# Patient Record
Sex: FEMALE | Race: BLACK OR AFRICAN AMERICAN | NOT HISPANIC OR LATINO | Employment: UNEMPLOYED | URBAN - METROPOLITAN AREA
[De-identification: names, ages, dates, MRNs, and addresses within clinical notes are randomized per-mention and may not be internally consistent; named-entity substitution may affect disease eponyms.]

---

## 2017-06-26 ENCOUNTER — HOSPITAL ENCOUNTER (INPATIENT)
Facility: HOSPITAL | Age: 67
LOS: 1 days | Discharge: HOME | DRG: 309 | End: 2017-06-27
Attending: EMERGENCY MEDICINE | Admitting: HOSPITALIST
Payer: MEDICARE

## 2017-06-26 DIAGNOSIS — R55 SYNCOPE AND COLLAPSE: ICD-10-CM

## 2017-06-26 DIAGNOSIS — R41.82 ALTERED MENTAL STATUS: ICD-10-CM

## 2017-06-26 DIAGNOSIS — I48.0 PAROXYSMAL ATRIAL FIBRILLATION: ICD-10-CM

## 2017-06-26 DIAGNOSIS — R00.1 BRADYCARDIA: Primary | ICD-10-CM

## 2017-06-26 PROBLEM — E11.9 NEW ONSET TYPE 2 DIABETES MELLITUS: Status: ACTIVE | Noted: 2017-06-26

## 2017-06-26 PROBLEM — E87.6 ACUTE HYPOKALEMIA: Status: ACTIVE | Noted: 2017-06-26

## 2017-06-26 PROBLEM — E66.01 MORBID OBESITY DUE TO EXCESS CALORIES: Status: ACTIVE | Noted: 2017-06-26

## 2017-06-26 PROBLEM — D69.6 THROMBOCYTOPENIA, UNSPECIFIED: Status: ACTIVE | Noted: 2017-06-26

## 2017-06-26 PROBLEM — L40.9 PSORIASIS: Status: ACTIVE | Noted: 2017-06-26

## 2017-06-26 LAB
ALBUMIN SERPL BCP-MCNC: 3.5 G/DL
ALP SERPL-CCNC: 81 U/L
ALT SERPL W/O P-5'-P-CCNC: 42 U/L
AMPHET+METHAMPHET UR QL: NEGATIVE
ANION GAP SERPL CALC-SCNC: 11 MMOL/L
AST SERPL-CCNC: 38 U/L
BARBITURATES UR QL SCN>200 NG/ML: NEGATIVE
BASOPHILS # BLD AUTO: 0.01 K/UL
BASOPHILS NFR BLD: 0.1 %
BENZODIAZ UR QL SCN>200 NG/ML: NEGATIVE
BILIRUB SERPL-MCNC: 0.7 MG/DL
BUN SERPL-MCNC: 20 MG/DL
BZE UR QL SCN: NEGATIVE
CALCIUM SERPL-MCNC: 8.8 MG/DL
CANNABINOIDS UR QL SCN: NEGATIVE
CHLORIDE SERPL-SCNC: 108 MMOL/L
CO2 SERPL-SCNC: 21 MMOL/L
CREAT SERPL-MCNC: 0.9 MG/DL
CREAT UR-MCNC: 24 MG/DL
DIFFERENTIAL METHOD: ABNORMAL
EOSINOPHIL # BLD AUTO: 0.1 K/UL
EOSINOPHIL NFR BLD: 0.7 %
ERYTHROCYTE [DISTWIDTH] IN BLOOD BY AUTOMATED COUNT: 13.2 %
EST. GFR  (AFRICAN AMERICAN): >60 ML/MIN/1.73 M^2
EST. GFR  (NON AFRICAN AMERICAN): >60 ML/MIN/1.73 M^2
ESTIMATED AVG GLUCOSE: 88 MG/DL
GLUCOSE SERPL-MCNC: 233 MG/DL
HBA1C MFR BLD HPLC: 4.7 %
HCT VFR BLD AUTO: 37 %
HGB BLD-MCNC: 12.6 G/DL
LACTATE SERPL-SCNC: 2.2 MMOL/L
LYMPHOCYTES # BLD AUTO: 1.6 K/UL
LYMPHOCYTES NFR BLD: 22.5 %
MAGNESIUM SERPL-MCNC: 1.6 MG/DL
MCH RBC QN AUTO: 30.6 PG
MCHC RBC AUTO-ENTMCNC: 34.1 %
MCV RBC AUTO: 90 FL
METHADONE UR QL SCN>300 NG/ML: NEGATIVE
MONOCYTES # BLD AUTO: 0.1 K/UL
MONOCYTES NFR BLD: 1.1 %
NEUTROPHILS # BLD AUTO: 5.5 K/UL
NEUTROPHILS NFR BLD: 75.3 %
OPIATES UR QL SCN: NEGATIVE
PCP UR QL SCN>25 NG/ML: NEGATIVE
PLATELET # BLD AUTO: 137 K/UL
PMV BLD AUTO: 11 FL
POCT GLUCOSE: 108 MG/DL (ref 70–110)
POCT GLUCOSE: 231 MG/DL (ref 70–110)
POCT GLUCOSE: 95 MG/DL (ref 70–110)
POTASSIUM SERPL-SCNC: 3.1 MMOL/L
PROT SERPL-MCNC: 7.3 G/DL
RBC # BLD AUTO: 4.12 M/UL
SODIUM SERPL-SCNC: 140 MMOL/L
TOXICOLOGY INFORMATION: NORMAL
TROPONIN I SERPL DL<=0.01 NG/ML-MCNC: <0.006 NG/ML
TSH SERPL DL<=0.005 MIU/L-ACNC: 2.3 UIU/ML
WBC # BLD AUTO: 7.28 K/UL

## 2017-06-26 PROCEDURE — 93010 ELECTROCARDIOGRAM REPORT: CPT | Mod: 76,,, | Performed by: INTERNAL MEDICINE

## 2017-06-26 PROCEDURE — 84443 ASSAY THYROID STIM HORMONE: CPT

## 2017-06-26 PROCEDURE — 63600175 PHARM REV CODE 636 W HCPCS: Performed by: HOSPITALIST

## 2017-06-26 PROCEDURE — 96365 THER/PROPH/DIAG IV INF INIT: CPT

## 2017-06-26 PROCEDURE — 25000003 PHARM REV CODE 250: Performed by: HOSPITALIST

## 2017-06-26 PROCEDURE — 82962 GLUCOSE BLOOD TEST: CPT

## 2017-06-26 PROCEDURE — 83605 ASSAY OF LACTIC ACID: CPT

## 2017-06-26 PROCEDURE — 83036 HEMOGLOBIN GLYCOSYLATED A1C: CPT

## 2017-06-26 PROCEDURE — 93010 ELECTROCARDIOGRAM REPORT: CPT | Mod: ,,, | Performed by: INTERNAL MEDICINE

## 2017-06-26 PROCEDURE — 93005 ELECTROCARDIOGRAM TRACING: CPT

## 2017-06-26 PROCEDURE — 63600175 PHARM REV CODE 636 W HCPCS: Performed by: EMERGENCY MEDICINE

## 2017-06-26 PROCEDURE — 84484 ASSAY OF TROPONIN QUANT: CPT

## 2017-06-26 PROCEDURE — 99282 EMERGENCY DEPT VISIT SF MDM: CPT | Mod: ,,, | Performed by: INTERNAL MEDICINE

## 2017-06-26 PROCEDURE — 96375 TX/PRO/DX INJ NEW DRUG ADDON: CPT

## 2017-06-26 PROCEDURE — 99285 EMERGENCY DEPT VISIT HI MDM: CPT | Mod: 25

## 2017-06-26 PROCEDURE — 99291 CRITICAL CARE FIRST HOUR: CPT | Mod: ,,, | Performed by: EMERGENCY MEDICINE

## 2017-06-26 PROCEDURE — 85025 COMPLETE CBC W/AUTO DIFF WBC: CPT

## 2017-06-26 PROCEDURE — 99233 SBSQ HOSP IP/OBS HIGH 50: CPT | Mod: ,,, | Performed by: HOSPITALIST

## 2017-06-26 PROCEDURE — 11000001 HC ACUTE MED/SURG PRIVATE ROOM

## 2017-06-26 PROCEDURE — 80307 DRUG TEST PRSMV CHEM ANLYZR: CPT

## 2017-06-26 PROCEDURE — 80053 COMPREHEN METABOLIC PANEL: CPT

## 2017-06-26 PROCEDURE — 83735 ASSAY OF MAGNESIUM: CPT

## 2017-06-26 RX ORDER — ONDANSETRON 2 MG/ML
8 INJECTION INTRAMUSCULAR; INTRAVENOUS
Status: COMPLETED | OUTPATIENT
Start: 2017-06-26 | End: 2017-06-26

## 2017-06-26 RX ORDER — POTASSIUM CHLORIDE 7.45 MG/ML
10 INJECTION INTRAVENOUS
Status: COMPLETED | OUTPATIENT
Start: 2017-06-26 | End: 2017-06-26

## 2017-06-26 RX ORDER — GLUCAGON 1 MG
1 KIT INJECTION
Status: DISCONTINUED | OUTPATIENT
Start: 2017-06-26 | End: 2017-06-27 | Stop reason: HOSPADM

## 2017-06-26 RX ORDER — INSULIN ASPART 100 [IU]/ML
0-5 INJECTION, SOLUTION INTRAVENOUS; SUBCUTANEOUS
Status: DISCONTINUED | OUTPATIENT
Start: 2017-06-26 | End: 2017-06-27 | Stop reason: HOSPADM

## 2017-06-26 RX ORDER — ACETAMINOPHEN 325 MG/1
650 TABLET ORAL EVERY 4 HOURS PRN
Status: DISCONTINUED | OUTPATIENT
Start: 2017-06-26 | End: 2017-06-27 | Stop reason: HOSPADM

## 2017-06-26 RX ORDER — SODIUM CHLORIDE 0.9 % (FLUSH) 0.9 %
3 SYRINGE (ML) INJECTION EVERY 8 HOURS
Status: DISCONTINUED | OUTPATIENT
Start: 2017-06-26 | End: 2017-06-27 | Stop reason: HOSPADM

## 2017-06-26 RX ORDER — ENOXAPARIN SODIUM 100 MG/ML
40 INJECTION SUBCUTANEOUS EVERY 24 HOURS
Status: DISCONTINUED | OUTPATIENT
Start: 2017-06-26 | End: 2017-06-27

## 2017-06-26 RX ORDER — POTASSIUM CHLORIDE 20 MEQ/1
40 TABLET, EXTENDED RELEASE ORAL 2 TIMES DAILY
Status: DISCONTINUED | OUTPATIENT
Start: 2017-06-26 | End: 2017-06-27 | Stop reason: HOSPADM

## 2017-06-26 RX ORDER — IBUPROFEN 200 MG
16 TABLET ORAL
Status: DISCONTINUED | OUTPATIENT
Start: 2017-06-26 | End: 2017-06-27 | Stop reason: HOSPADM

## 2017-06-26 RX ORDER — PROMETHAZINE HYDROCHLORIDE 25 MG/1
25 TABLET ORAL EVERY 6 HOURS PRN
Status: DISCONTINUED | OUTPATIENT
Start: 2017-06-26 | End: 2017-06-27 | Stop reason: HOSPADM

## 2017-06-26 RX ORDER — ONDANSETRON 8 MG/1
8 TABLET, ORALLY DISINTEGRATING ORAL EVERY 8 HOURS PRN
Status: DISCONTINUED | OUTPATIENT
Start: 2017-06-26 | End: 2017-06-27 | Stop reason: HOSPADM

## 2017-06-26 RX ORDER — IBUPROFEN 200 MG
24 TABLET ORAL
Status: DISCONTINUED | OUTPATIENT
Start: 2017-06-26 | End: 2017-06-27 | Stop reason: HOSPADM

## 2017-06-26 RX ORDER — HYDROCODONE BITARTRATE AND ACETAMINOPHEN 5; 325 MG/1; MG/1
1 TABLET ORAL EVERY 4 HOURS PRN
Status: DISCONTINUED | OUTPATIENT
Start: 2017-06-26 | End: 2017-06-27 | Stop reason: HOSPADM

## 2017-06-26 RX ADMIN — SODIUM CHLORIDE, PRESERVATIVE FREE 3 ML: 5 INJECTION INTRAVENOUS at 09:06

## 2017-06-26 RX ADMIN — PROMETHAZINE HYDROCHLORIDE 25 MG: 25 TABLET ORAL at 06:06

## 2017-06-26 RX ADMIN — POTASSIUM CHLORIDE 40 MEQ: 1500 TABLET, EXTENDED RELEASE ORAL at 09:06

## 2017-06-26 RX ADMIN — ONDANSETRON 8 MG: 2 INJECTION INTRAMUSCULAR; INTRAVENOUS at 11:06

## 2017-06-26 RX ADMIN — POTASSIUM CHLORIDE 10 MEQ: 10 INJECTION, SOLUTION INTRAVENOUS at 12:06

## 2017-06-26 RX ADMIN — ENOXAPARIN SODIUM 40 MG: 100 INJECTION SUBCUTANEOUS at 06:06

## 2017-06-26 RX ADMIN — ONDANSETRON 8 MG: 8 TABLET, ORALLY DISINTEGRATING ORAL at 03:06

## 2017-06-26 RX ADMIN — POTASSIUM CHLORIDE 40 MEQ: 1500 TABLET, EXTENDED RELEASE ORAL at 02:06

## 2017-06-26 NOTE — ED NOTES
Pt incontinent of stool. Pt cleaned, Linens and gown changed. Pt reports burning at IV site. KCl infusion infusion decreased to 75 ml/hr. Dr. Morales aware.

## 2017-06-26 NOTE — HPI
This is a 66 year old female with new onset DM, morbid obesity, and Psoriasis who presents with complaints of a presyncopal episode.  The patient states she was of her usual state of health standing up talking when she began to feel lightheaded and dizzy and her family had to sit her down.  The episode lasted several minutes where she had a decreased level of consciousness, had diaphoresis, started dry heaving, noted some palpitations.  The patient endorses that she has these type of episodes every 4 to 6 months, but never this severe.  She denies any chest pain or shortness of breath, but her family notes that her breathing appeared labored during the episode.  EMS was activated and upon presentation to the ED, the patient was profoundly bradycardic in the 30s and was given a dose of atropine.  The patient then converted to atrial fibrillation with RVR.

## 2017-06-26 NOTE — ED PROVIDER NOTES
"Encounter Date: 6/26/2017    SCRIBE #1 NOTE: I, Sunil Sawyer, am scribing for, and in the presence of, Dr. Morales.       History     Chief Complaint   Patient presents with    Bradycardia     Time seen by provider: 10:09 AM    This is a 66 y.o. female with pertinent PMHx of DM and gout who presents to the ED via EMS after a sudden onset episode of moderate to severe generalized weakness, dizziness, syncope, nausea, diaphoresis, and confusion that onset at ~9:00 am today. Per the pt's granddaughter the pt has these episodes semi regularly and normally drinks or smells some apple cider vinegar and feels better. However this episode was the longest lasting they had seen as they normally last ~5 minutes and the pt became unresponsive so they called EMS. Granddaughter notes that the pt has DM but does not take any medications at all for anything as she dislikes doctors and hospitals, she also notes that she tried giving the pt a candy when the episode onset and this did help but only briefly. When EMS arrived they noted her heart rate was in the 30's and her blood pressure was 110. Pt was also noted to be diaphoretic but awake. They gave her 2mg atropine and this brought her heart rate up to the 70's and her blood pressure was last recorded at 116 by EMS. Pt denies any pain medicine, drug use, any pain, chest pain, shortness of breath, or headache. When asked the pt's only complaint is of feeling "irritated" and endorses nothing else. There are no other associated symptoms or mitigating/aggravating factors reported at this time.           Review of patient's allergies indicates:  No Known Allergies  Past Medical History:   Diagnosis Date    DM (diabetes mellitus)     Gout      History reviewed. No pertinent surgical history.  History reviewed. No pertinent family history.  Social History   Substance Use Topics    Smoking status: Never Smoker    Smokeless tobacco: Never Used    Alcohol use No     Review of Systems "   Constitutional: Positive for diaphoresis. Negative for chills and fever.   HENT: Negative for congestion.    Eyes: Negative for pain.   Respiratory: Negative for cough and shortness of breath.    Cardiovascular: Negative for chest pain.   Gastrointestinal: Positive for nausea. Negative for abdominal pain and vomiting.   Genitourinary: Negative for difficulty urinating and dysuria.   Musculoskeletal: Negative for back pain and neck pain.   Skin: Negative for rash.   Neurological: Positive for dizziness, speech difficulty and weakness (generalized). Negative for headaches.   Psychiatric/Behavioral: Positive for confusion.       Physical Exam     Initial Vitals   BP Pulse Resp Temp SpO2   -- -- -- -- --      MAP       --         Physical Exam    Nursing note and vitals reviewed.  Constitutional: No distress (acute).   Pt is ill appearing and lethargic.    HENT:   Mouth/Throat: Mucous membranes are dry.   Eyes: No scleral icterus.   Pupils are bilaterally pinpoint.    Neck: Neck supple.   Cardiovascular: Normal rate and regular rhythm.   Pulmonary/Chest: Breath sounds normal. No respiratory distress.   Abdominal: Soft. She exhibits no distension. There is no tenderness.   Musculoskeletal: She exhibits no edema.   Neurological: She is alert and oriented to person, place, and time.   Pt is lethargic but arousable to voice and able to follow commands. No focal weakness.    Skin: No rash noted.         ED Course   Critical Care  Date/Time: 6/26/2017 12:11 PM  Performed by: DIMITRIS BAIRD.  Authorized by: DIMITRIS BAIRD   Total critical care time (exclusive of procedural time) : 60 minutes  Critical care time was exclusive of teaching time and separately billable procedures and treating other patients.  Critical care was necessary to treat or prevent imminent or life-threatening deterioration of the following conditions: CNS failure or compromise.  Critical care was time spent personally by me on the following  activities: development of treatment plan with patient or surrogate, discussions with consultants, evaluation of patient's response to treatment, examination of patient, obtaining history from patient or surrogate, ordering and performing treatments and interventions, ordering and review of laboratory studies, ordering and review of radiographic studies, pulse oximetry and re-evaluation of patient's condition.        Labs Reviewed   CBC W/ AUTO DIFFERENTIAL - Abnormal; Notable for the following:        Result Value    Platelets 137 (*)     Mono # 0.1 (*)     Gran% 75.3 (*)     Mono% 1.1 (*)     All other components within normal limits   COMPREHENSIVE METABOLIC PANEL - Abnormal; Notable for the following:     Potassium 3.1 (*)     CO2 21 (*)     Glucose 233 (*)     All other components within normal limits   POCT GLUCOSE - Abnormal; Notable for the following:     POCT Glucose 231 (*)     All other components within normal limits   MAGNESIUM   TROPONIN I   TSH   LACTIC ACID, PLASMA   DRUG SCREEN PANEL, URINE EMERGENCY   HEMOGLOBIN A1C   POCT GLUCOSE     EKG Readings: (Independently Interpreted)   Sinus rhythm with 1st degree AV block and RBBB.        X-Rays:   Independently Interpreted Readings:   Head CT: No mass or bleed.      Medical Decision Making:   History:   Old Medical Records: I decided to obtain old medical records.  Initial Assessment:   65 yo f, h/o DM but takes no meds (per families), no h/o drug abuse per family, recurrent syncope episodes but has never been to hospital or had work-up, this am about 1 h PTA, pt became less responsive, with n/v and diaphoresis.  EMS called, arrived and found pt very lethargic, HR 30, /palp.  Given atropine 1 x 2 with improvement in HR to 70 and moderate improvement in MS but not back to baseline.  On arrival to ED, VSS but pt lethargic, ill-appearing.  Denies CP/SOB but still reports feeling ill.  Denies CP/SOB.  Pupils pinpoint on arrival but o/w exam without  significant abnormalities.  Differential Diagnosis:   Bradycardia with syncope, resolved.  Unclear etiology.  EKG with RBBB and NSR on arrival.  No old EKG for comparison.  ddx would include inferior wall MI, tox (opiates/clonidine/bblocker/ccb) vs electrolyte (hyperkalemia) vs vasovagal episode  -cardiology eval - do not suspect acute MI  -labs  -monitor closely    AMS - nonfocal exam, no HA/fever and acute in onset  -r/o ICH with CT head  -monitor closely  Independently Interpreted Test(s):   I have ordered and independently interpreted X-rays - see prior notes.  I have ordered and independently interpreted EKG Reading(s) - see prior notes  Clinical Tests:   Lab Tests: Ordered and Reviewed  Radiological Study: Ordered and Reviewed  Medical Tests: Ordered and Reviewed  Other:   I have discussed this case with another health care provider.            Scribe Attestation:   Scribe #1: I performed the above scribed service and the documentation accurately describes the services I performed. I attest to the accuracy of the note.    Attending Attestation:           Physician Attestation for Scribe:  Physician Attestation Statement for Scribe #1: I, Dr. Morales, reviewed documentation, as scribed by Sunil Sawyer in my presence, and it is both accurate and complete.                 ED Course     12:23 PM  Mild hypokalemia but labs o/w unremarkable  CT head negative  Pt now in a fib, intermittently with mild RVR  MS has improved but not back to baseline  Intermittent n/v  Will admit to medicine for further work-up    Clinical Impression:   The primary encounter diagnosis was Bradycardia. Diagnoses of Altered mental status, Syncope and collapse, and Paroxysmal atrial fibrillation were also pertinent to this visit.    Disposition:   Disposition: Admitted                        Catalina Morales MD  06/30/17 4109

## 2017-06-26 NOTE — SUBJECTIVE & OBJECTIVE
Past Medical History:   Diagnosis Date    DM (diabetes mellitus)     Gout        No past surgical history on file.    Review of patient's allergies indicates:  No Known Allergies    No current facility-administered medications on file prior to encounter.      No current outpatient prescriptions on file prior to encounter.     Family History     None        Social History Main Topics    Smoking status: Not on file    Smokeless tobacco: Not on file    Alcohol use Not on file    Drug use: Unknown    Sexual activity: Not on file     Review of Systems   Constitutional: Positive for fatigue. Negative for activity change and appetite change.   HENT: Negative for postnasal drip and rhinorrhea.    Eyes: Positive for visual disturbance.   Respiratory: Negative for cough, chest tightness and shortness of breath.    Cardiovascular: Positive for palpitations. Negative for chest pain.   Gastrointestinal: Positive for nausea and vomiting. Negative for abdominal distention, abdominal pain and diarrhea.   Endocrine: Positive for polydipsia and polyuria. Negative for cold intolerance and heat intolerance.   Genitourinary: Positive for frequency. Negative for difficulty urinating.   Musculoskeletal: Negative for arthralgias.   Skin: Positive for rash.        Silvery plaque on right knee   Neurological: Positive for dizziness, syncope and light-headedness. Negative for tremors, numbness and headaches.   Hematological: Does not bruise/bleed easily.   Psychiatric/Behavioral: Negative for agitation and behavioral problems. The patient is not nervous/anxious.      Objective:     Vital Signs (Most Recent):  Temp: 97.8 °F (36.6 °C) (06/26/17 1544)  Pulse: 101 (06/26/17 1544)  Resp: 20 (06/26/17 1544)  BP: 139/89 (06/26/17 1544)  SpO2: 96 % (06/26/17 1544) Vital Signs (24h Range):  Temp:  [97.8 °F (36.6 °C)] 97.8 °F (36.6 °C)  Pulse:  [] 101  Resp:  [19-21] 20  SpO2:  [96 %-100 %] 96 %  BP: (128-143)/(73-91) 139/89        There  is no height or weight on file to calculate BMI.    Physical Exam   Constitutional: She is oriented to person, place, and time. She appears well-developed and well-nourished. No distress.   Morbidly obese female in NAD alert and oriented x4 pleasant and cooperative with exam   HENT:   Head: Normocephalic and atraumatic.   Mouth/Throat: Oropharynx is clear and moist. No oropharyngeal exudate.   Eyes: Conjunctivae and EOM are normal. Pupils are equal, round, and reactive to light.   Neck: Normal range of motion. Neck supple. No JVD present.   Cardiovascular: Normal heart sounds and intact distal pulses.  An irregularly irregular rhythm present. Tachycardia present.  Exam reveals no gallop and no friction rub.    No murmur heard.  Pulmonary/Chest: Effort normal and breath sounds normal.   Abdominal: Soft. Bowel sounds are normal. She exhibits no distension. There is no tenderness. There is no guarding.   Musculoskeletal: She exhibits no edema.   Lymphadenopathy:     She has no cervical adenopathy.   Neurological: She is alert and oriented to person, place, and time.   Skin: Skin is warm and dry. Rash noted.   Psychiatric: She has a normal mood and affect. Her behavior is normal. Thought content normal.        Significant Labs:   CBC:   Recent Labs  Lab 06/26/17  1053   WBC 7.28   HGB 12.6   HCT 37.0   *     CMP:   Recent Labs  Lab 06/26/17  1053      K 3.1*      CO2 21*   *   BUN 20   CREATININE 0.9   CALCIUM 8.8   PROT 7.3   ALBUMIN 3.5   BILITOT 0.7   ALKPHOS 81   AST 38   ALT 42   ANIONGAP 11   EGFRNONAA >60.0       Significant Imaging: I have reviewed and interpreted all pertinent imaging results/findings within the past 24 hours.

## 2017-06-26 NOTE — H&P
[facility] Hospital Medicine  History & Physical    Patient Name: Danita Hall  MRN: [id number]  Admission Date: 6/26/2017  Attending Physician: Tayla Hinkle MD  Primary Care Provider: Primary Doctor [facility] Medicine Team: [facility] MED B Tayla Hinkle MD     Patient information was obtained from patient, relative(s) and ER records.     Subjective:     Principal Problem:Syncope and collapse    Chief Complaint:   Chief Complaint   Patient presents with    Bradycardia        HPI: This is a 66 year old female with new onset DM, morbid obesity, and Psoriasis who presents with complaints of a presyncopal episode.  The patient states she was of her usual state of health standing up talking when she began to feel lightheaded and dizzy and her family had to sit her down.  The episode lasted several minutes where she had a decreased level of consciousness, had diaphoresis, started dry heaving, noted some palpitations.  The patient endorses that she has these type of episodes every 4 to 6 months, but never this severe.  She denies any chest pain or shortness of breath, but her family notes that her breathing appeared labored during the episode.  EMS was activated and upon presentation to the ED, the patient was profoundly bradycardic in the 30s and was given a dose of atropine.  The patient then converted to atrial fibrillation with RVR.      Past Medical History:   Diagnosis Date    DM (diabetes mellitus)     Gout        No past surgical history on file.    Review of patient's allergies indicates:  No Known Allergies    No current facility-administered medications on file prior to encounter.      No current outpatient prescriptions on file prior to encounter.     Family History     None        Social History Main Topics    Smoking status: Not on file    Smokeless tobacco: Not on file    Alcohol use Not on file    Drug use: Unknown    Sexual activity: Not on file     Review of Systems    Constitutional: Positive for fatigue. Negative for activity change and appetite change.   HENT: Negative for postnasal drip and rhinorrhea.    Eyes: Positive for visual disturbance.   Respiratory: Negative for cough, chest tightness and shortness of breath.    Cardiovascular: Positive for palpitations. Negative for chest pain.   Gastrointestinal: Positive for nausea and vomiting. Negative for abdominal distention, abdominal pain and diarrhea.   Endocrine: Positive for polydipsia and polyuria. Negative for cold intolerance and heat intolerance.   Genitourinary: Positive for frequency. Negative for difficulty urinating.   Musculoskeletal: Negative for arthralgias.   Skin: Positive for rash.        Silvery plaque on right knee   Neurological: Positive for dizziness, syncope and light-headedness. Negative for tremors, numbness and headaches.   Hematological: Does not bruise/bleed easily.   Psychiatric/Behavioral: Negative for agitation and behavioral problems. The patient is not nervous/anxious.      Objective:     Vital Signs (Most Recent):  Temp: 97.8 °F (36.6 °C) (06/26/17 1544)  Pulse: 101 (06/26/17 1544)  Resp: 20 (06/26/17 1544)  BP: 139/89 (06/26/17 1544)  SpO2: 96 % (06/26/17 1544) Vital Signs (24h Range):  Temp:  [97.8 °F (36.6 °C)] 97.8 °F (36.6 °C)  Pulse:  [] 101  Resp:  [19-21] 20  SpO2:  [96 %-100 %] 96 %  BP: (128-143)/(73-91) 139/89        There is no height or weight on file to calculate BMI.    Physical Exam   Constitutional: She is oriented to person, place, and time. She appears well-developed and well-nourished. No distress.   Morbidly obese female in NAD alert and oriented x4 pleasant and cooperative with exam   HENT:   Head: Normocephalic and atraumatic.   Mouth/Throat: Oropharynx is clear and moist. No oropharyngeal exudate.   Eyes: Conjunctivae and EOM are normal. Pupils are equal, round, and reactive to light.   Neck: Normal range of motion. Neck supple. No JVD present.    Cardiovascular: Normal heart sounds and intact distal pulses.  An irregularly irregular rhythm present. Tachycardia present.  Exam reveals no gallop and no friction rub.    No murmur heard.  Pulmonary/Chest: Effort normal and breath sounds normal.   Abdominal: Soft. Bowel sounds are normal. She exhibits no distension. There is no tenderness. There is no guarding.   Musculoskeletal: She exhibits no edema.   Lymphadenopathy:     She has no cervical adenopathy.   Neurological: She is alert and oriented to person, place, and time.   Skin: Skin is warm and dry. Rash noted.   Psychiatric: She has a normal mood and affect. Her behavior is normal. Thought content normal.        Significant Labs:   CBC:   Recent Labs  Lab 06/26/17  1053   WBC 7.28   HGB 12.6   HCT 37.0   *     CMP:   Recent Labs  Lab 06/26/17  1053      K 3.1*      CO2 21*   *   BUN 20   CREATININE 0.9   CALCIUM 8.8   PROT 7.3   ALBUMIN 3.5   BILITOT 0.7   ALKPHOS 81   AST 38   ALT 42   ANIONGAP 11   EGFRNONAA >60.0       Significant Imaging: I have reviewed and interpreted all pertinent imaging results/findings within the past 24 hours.    Assessment/Plan:     * Syncope and collapse    Unsure if episode was secondary to bradycardia with hypoperfusion to the brain  Will obtain a CT of the head to r/o intracerebral hemorrhage or edema  Continue telemetry monitoring  Obtain cardiac enzymes and get 12-lead EKG to ascertain current rhythm            Bradycardia    The patient has been tachycardic with atrial fibrillation since atropine was given  Consult EPS to assess and give treatment recommendations          Acute hypokalemia    Supplement potassium and follow electrolytes          Psoriasis    The patient has never been formally diagnosed, but will give suggestions for outpatient management          Paroxysmal atrial fibrillation    Suspect, will start full dose anticoagulation after obtain results from CT of the Head           Morbid obesity due to excess calories    Will consult dietary to discuss meal planning and lifestyle modifications          Thrombocytopenia, unspecified    Slightly decreased, no signs of bleeding, will monitor as needed          New onset type 2 diabetes mellitus    Random blood glucose was 233, suspect new onset DM, the patient has never been diagnosed, she just felt that she probably had the disease. Obtained HBA1c which is 4.7 and new onset DM is unlikely.  Patient may have issues with hypoglycemia?  Patient and family notes that the patient responds to juice when she had similar episodes in the past          VTE Risk Mitigation         Ordered     enoxaparin injection 40 mg  Daily     Route:  Subcutaneous        06/26/17 1418     Medium Risk of VTE  Once      06/26/17 1418        Tayla Hinkle MD  Department of Hospital Medicine   [facility]

## 2017-06-26 NOTE — ASSESSMENT & PLAN NOTE
The patient has never been formally diagnosed, but will give suggestions for outpatient management

## 2017-06-26 NOTE — ASSESSMENT & PLAN NOTE
The patient has been tachycardic with atrial fibrillation since atropine was given  Consult EPS to assess and give treatment recommendations

## 2017-06-26 NOTE — CONSULTS
Cardiology History & Physical  Attending Physician: Catalina Morales MD  Reason for Consult: Bradycardia     HPI:   66 y.o. woman with PMH of DM who presents to the ER via EMS after developing sudden onset dizzyness, nausea, vomiting and confusion. Upon arrival of EMS she was noted to be bradycardic in the 30s and was given some atropine with resolution of her bradycardia however she was altered. Cardiology was consulted given her bradycardia.    On bedside assessment she is alert to name, place and situation but appears distressed and uncomfortable. She is reluctant to answer questions but denies any chest pain, shortness of breath, palpitations, PND, orthopnea.    Her granddaughter at bedside stated she was last normal around 830AM this morning but developed acute dizzyness as her first symptom. She was made to sit down and given some candy or mints, developed nausea and dry heaving at some point. Granddaughter states she has had symptoms like this before while standing but resolved with sitting and drinking juice or eating candy. This episode of AMS did not resolve with those interventions, prompting EMS call.    During my interview patient had an episode of vomiting and went into afib rvr.    ROS:    Unable to obtain full ROS see above  PMH:     Past Medical History:   Diagnosis Date    DM (diabetes mellitus)     Gout      No past surgical history on file.  Allergies:   Review of patient's allergies indicates:  No Known Allergies  Medications:     No current facility-administered medications on file prior to encounter.      No current outpatient prescriptions on file prior to encounter.       Inpatient Medications   Continuous Infusions:   Scheduled Meds:   ondansetron  8 mg Intravenous ED 1 Time     PRN Meds:     Social History:     Social History   Substance Use Topics    Smoking status: Not on file    Smokeless tobacco: Not on file    Alcohol use Not on file     Family History:   No family  history on file.  Physical Exam:     Vitals:  Pulse:  [119]   Resp:  [21]   BP: (129)/(73)   SpO2:  [97 %]   I/O's:  No intake or output data in the 24 hours ending 06/26/17 1100     Constitutional: Appearing uncomfortable  HEENT: Sclera anicteric, PERRLA, EOMI  Neck: Unable to visualize JVD, no carotid bruits  CV: RRR,  normal S1/S2  Pulm: clear anteriorly  GI: obese, some tympany but overall soft  Extremities: Trace LE edema, warm and well perfused  Skin: No ecchymosis, erythema, or ulcers  Psych: alert to name, place, situation but unwilling to participate in conversation about her presentation      Labs:     No results for input(s): NA, K, CL, CO2, BUN, CREATININE, GLUCOSE, ANIONGAP in the last 168 hours.  No results for input(s): AST, ALT, ALKPHOS, BILITOT, BILIDIR, ALBUMIN in the last 168 hours.  No results for input(s): TROPONINI, BNP in the last 168 hours. No results for input(s): WBC, HGB, HCT, PLT, GRAN in the last 168 hours.  No results for input(s): PTT, INR in the last 168 hours.  No results found for: CHOL, HDL, LDLCALC, TRIG  No results found for: HGBA1C     Micro:  Blood Cultures  No results found for: LABBLOO  Urine Cultures  No results found for: LABURIN    Imaging:       No results found for: EF    EKG: sinus with 1st degree, right bundle type morphology, later developed into Afib    Assessment:   67 yo female with a history of obesity and DM who presented with dizziness, confusion, nausea and bradycardia. In the ER she developed Afib.    Plan:   Bradycardia  - likely vasovagal given it occurred in the setting of dizziness, nausea  - no ischemic changes on EKG    New onset afib  - patient went into afib during interview, denies a history of afib  - CV score 3(66,woman, DM)  - for rate control can start lopressor 25mg PO Q6h  - recommend anticoagulation once head ct done and if no other contraindications exist  - given episode occurred during stress of vomiting, there is a chance it resolves in the  next 24 hours  - if stays in afib after 24 hours would recommend DCCV, may need an 30 day event monitor to assess burden if resolves spontaneously   - recommend 2d echo with CFD, TSH/T4    Consult team to follow    Patient seen and examined with Cardiology Staff Dr. Finnegan    Signed:  Ramon Nguyen DO  Cardiology Fellow  Pager - [phone]  6/26/2017 11:00 AM

## 2017-06-26 NOTE — ASSESSMENT & PLAN NOTE
Random blood glucose was 233, suspect new onset DM, the patient has never been diagnosed, she just felt that she probably had the disease. Obtained HBA1c which is 4.7 and new onset DM is unlikely.  Patient may have issues with hypoglycemia?  Patient and family notes that the patient responds to juice when she had similar episodes in the past

## 2017-06-26 NOTE — ED NOTES
Dr. Hinkle with Internal Medicine at bedside and states OK to stop IV Potassium.  States she will order Oral Potassium.

## 2017-06-27 ENCOUNTER — CLINICAL SUPPORT (OUTPATIENT)
Dept: ELECTROPHYSIOLOGY | Facility: CLINIC | Age: 67
DRG: 309 | End: 2017-06-27
Payer: MEDICARE

## 2017-06-27 VITALS
RESPIRATION RATE: 18 BRPM | WEIGHT: 281 LBS | BODY MASS INDEX: 42.59 KG/M2 | OXYGEN SATURATION: 97 % | DIASTOLIC BLOOD PRESSURE: 71 MMHG | HEART RATE: 62 BPM | TEMPERATURE: 98 F | HEIGHT: 68 IN | SYSTOLIC BLOOD PRESSURE: 117 MMHG

## 2017-06-27 DIAGNOSIS — R00.1 BRADYCARDIA: Primary | ICD-10-CM

## 2017-06-27 DIAGNOSIS — R55 SYNCOPE AND COLLAPSE: ICD-10-CM

## 2017-06-27 LAB
ANION GAP SERPL CALC-SCNC: 7 MMOL/L
BUN SERPL-MCNC: 13 MG/DL
CALCIUM SERPL-MCNC: 9.2 MG/DL
CHLORIDE SERPL-SCNC: 108 MMOL/L
CO2 SERPL-SCNC: 28 MMOL/L
CREAT SERPL-MCNC: 1 MG/DL
DIASTOLIC DYSFUNCTION: NO
EST. GFR  (AFRICAN AMERICAN): >60 ML/MIN/1.73 M^2
EST. GFR  (NON AFRICAN AMERICAN): 59.3 ML/MIN/1.73 M^2
ESTIMATED PA SYSTOLIC PRESSURE: 26.43
GLUCOSE SERPL-MCNC: 90 MG/DL
POCT GLUCOSE: 122 MG/DL (ref 70–110)
POCT GLUCOSE: 98 MG/DL (ref 70–110)
POCT GLUCOSE: 99 MG/DL (ref 70–110)
POTASSIUM SERPL-SCNC: 4.3 MMOL/L
RETIRED EF AND QEF - SEE NOTES: 60 (ref 55–65)
SODIUM SERPL-SCNC: 143 MMOL/L

## 2017-06-27 PROCEDURE — 93270 REMOTE 30 DAY ECG REV/REPORT: CPT | Mod: PBBFAC | Performed by: INTERNAL MEDICINE

## 2017-06-27 PROCEDURE — 36415 COLL VENOUS BLD VENIPUNCTURE: CPT

## 2017-06-27 PROCEDURE — 93010 ELECTROCARDIOGRAM REPORT: CPT | Mod: ,,, | Performed by: INTERNAL MEDICINE

## 2017-06-27 PROCEDURE — 99239 HOSP IP/OBS DSCHRG MGMT >30: CPT | Mod: ,,, | Performed by: HOSPITALIST

## 2017-06-27 PROCEDURE — 93306 TTE W/DOPPLER COMPLETE: CPT | Mod: PBBFAC | Performed by: INTERNAL MEDICINE

## 2017-06-27 PROCEDURE — 93005 ELECTROCARDIOGRAM TRACING: CPT

## 2017-06-27 PROCEDURE — 80048 BASIC METABOLIC PNL TOTAL CA: CPT

## 2017-06-27 PROCEDURE — 25000003 PHARM REV CODE 250: Performed by: HOSPITALIST

## 2017-06-27 PROCEDURE — 93306 TTE W/DOPPLER COMPLETE: CPT

## 2017-06-27 RX ORDER — METOPROLOL SUCCINATE 50 MG/1
50 TABLET, EXTENDED RELEASE ORAL DAILY
Status: DISCONTINUED | OUTPATIENT
Start: 2017-06-28 | End: 2017-06-27 | Stop reason: HOSPADM

## 2017-06-27 RX ORDER — METOPROLOL SUCCINATE 50 MG/1
50 TABLET, EXTENDED RELEASE ORAL DAILY
Qty: 30 TABLET | Refills: 1 | Status: ON HOLD | OUTPATIENT
Start: 2017-06-28 | End: 2017-08-25 | Stop reason: HOSPADM

## 2017-06-27 RX ORDER — METOPROLOL TARTRATE 25 MG/1
25 TABLET, FILM COATED ORAL EVERY 6 HOURS
Status: DISCONTINUED | OUTPATIENT
Start: 2017-06-27 | End: 2017-06-27

## 2017-06-27 RX ADMIN — SODIUM CHLORIDE, PRESERVATIVE FREE 3 ML: 5 INJECTION INTRAVENOUS at 01:06

## 2017-06-27 RX ADMIN — APIXABAN 5 MG: 5 TABLET, FILM COATED ORAL at 08:06

## 2017-06-27 RX ADMIN — METOPROLOL TARTRATE 25 MG: 25 TABLET ORAL at 01:06

## 2017-06-27 RX ADMIN — POTASSIUM CHLORIDE 40 MEQ: 1500 TABLET, EXTENDED RELEASE ORAL at 08:06

## 2017-06-27 RX ADMIN — PROMETHAZINE HYDROCHLORIDE 25 MG: 25 TABLET ORAL at 08:06

## 2017-06-27 RX ADMIN — METOPROLOL TARTRATE 25 MG: 25 TABLET ORAL at 08:06

## 2017-06-27 RX ADMIN — SODIUM CHLORIDE, PRESERVATIVE FREE 3 ML: 5 INJECTION INTRAVENOUS at 06:06

## 2017-06-27 NOTE — NURSING
Verbal/written discharge orders given. Teaching on medications provided in great details. Patient questions answsered. Left unit with family via wheelchair

## 2017-06-27 NOTE — DISCHARGE SUMMARY
Discharge Summary  Hospital Medicine    Attending Provider on Discharge: Adalberto Saenz     Discharging Team: [facility]OS[facility] MED B     Date of Admission:  6/26/2017         Date of Discharge:  6/27/2017      Diagnoses:     Principal Problem(s):   Syncope and collapse     Secondary Problems:  Active Hospital Problems    Diagnosis    *Syncope and collapse    Bradycardia    Acute hypokalemia    New onset type 2 diabetes mellitus    Psoriasis    Thrombocytopenia, unspecified    Morbid obesity due to excess calories    Paroxysmal atrial fibrillation        Hospital Course:      Morbidly obese 66F DM2 who presents to the ER via EMS after developing sudden onset dizzyness, nausea, vomiting and confusion. Upon arrival of EMS she was noted to be bradycardic in the 30s and was given some atropine with resolution of her bradycardia; however she was altered. Cardiology was consulted given her bradycardia.  However, upon cardiology evaluation, patient converted to afib with RVR during another episode of retching.  Cardiology feels initial episode related to vasovagal symptoms. Subsequent episode of RVR is not thought due to atropine but stress response due to retching.  Cardiology would like to place LTM to assess atrial fibrillation burden as the patient converted spontaneously to atrial fibrillation. They would like the patient to continue on metoprolol as well as apixiban for CVA prevention. Patient will f/u in cardiology clinic within one month.  See below for further details:    Syncope and collapse  - Likely related to vasovagal  - LTM planned with outpatient cardiology f/u     Afib with RVR  - Greatly appreciate cardiology recommendations  - LTM to assess afib burden  - Start BB and A/C (given elevated [id number])  - F/u in cardiology clinic     Psoriasis  - Mild lesion on knee  - Emollients suggested  - Offered ambulatory referral to derm, patient declined     Morbid obesity due to excess calories  - Nutrition f/u  as outpatient  - Encouraged lifestyle modifications       Significant Diagnostic Studies:   Labs:   Recent Labs      06/26/17   1053   WBC  7.28   RBC  4.12   HGB  12.6   HCT  37.0   PLT  137*   MCV  90   MCH  30.6   MCHC  34.1   GRAN  75.3*  5.5   LYMPH  22.5  1.6   MONO  1.1*  0.1*   EOS  0.1      Recent Labs      06/26/17   1053  06/27/17   0401   GLU  233*  90   NA  140  143   K  3.1*  4.3   CL  108  108   CO2  21*  28   BUN  20  13   CREATININE  0.9  1.0   CALCIUM  8.8  9.2   ANIONGAP  11  7*   MG  1.6   --         Microbiology:   No results found for: LABBLOO  No results found for: LABURIN  No results found for: LABAERO  No results found for: LABANAE    Radiology:   No results found for this or any previous visit.   No results found for this or any previous visit.   No results found for this or any previous visit.   Results for orders placed during the hospital encounter of 06/26/17   CT Head Without Contrast    Narrative Procedure comments: CT examination of the head was performed from the skull base through the vertex without the use of intravenous contrast using 5-mm axial images.    Comparison: 6/26/2017 at 1150 hrs.    Findings:    There is no evidence of acute intracranial hemorrhage, hydrocephalus, midline shift, or mass effect.Gray-white matter differentiation is maintained.The basal cisterns are patent. No extra-axial collections are appreciated.The visualized paranasal sinuses and mastoid air cells are clear.The calvarium is intact.    Impression No CT evidence of acute intracranial abnormality.      Electronically signed by: KAILEE LABOY  Date:     06/26/17  Time:    22:34         Cardiac Studies: 2D ECHO    CONCLUSIONS     1 - Normal left ventricular systolic function (EF 60-65%).     2 - Normal left ventricular diastolic function.     3 - Normal right ventricular systolic function .     4 - The estimated PA systolic pressure is greater than 23 mmHg.     Significant Treatments/Procedures:    None    Consults while in Hospital:   Cardiology    Discharge Medications:      Current Discharge Medication List      START taking these medications    Details   apixaban 5 mg Tab Take 1 tablet (5 mg total) by mouth 2 (two) times daily.  Qty: 60 tablet, Refills: 1      metoprolol succinate (TOPROL-XL) 50 MG 24 hr tablet Take 1 tablet (50 mg total) by mouth once daily.  Qty: 30 tablet, Refills: 1              Discharge Diet:regular diet    Activity: activity as tolerated    Discharged Condition: Stable    Discharge Disposition: Home or Self Care    Follow-up:   Referral made to cardiology upon d/c.  Patient discharged with LTM in place.    Studies/Results pending on discharge:   None    Adalberto Saenz MD  [facility] Medicine

## 2017-06-27 NOTE — PLAN OF CARE
Called pacemaker lab to ensure receipt of order for 30 day event monitor. They will place monitor prior to d/c.

## 2017-06-27 NOTE — PLAN OF CARE
Problem: Patient Care Overview  Goal: Plan of Care Review  Outcome: Ongoing (interventions implemented as appropriate)  Patient remained without any episodes of dizziness and syncope. Discharge with supplies for 30day monitoring

## 2017-06-27 NOTE — PROGRESS NOTES
Progress Note  Hospital Medicine    Provider team:    [facility] CARDIOLOGY TEAM C - FELLOWS/CONSULTS  [facility]OS[facility] MED B  Admit Date: 6/26/2017  Encounter Date: 06/27/2017     SUBJECTIVE:     Follow-up Visit for: Syncope and collapse    HPI (See H&P for complete P,F,SHx): Morbidly obese 66F DM2 who presents to the ER via EMS after developing sudden onset dizzyness, nausea, vomiting and confusion. Upon arrival of EMS she was noted to be bradycardic in the 30s and was given some atropine with resolution of her bradycardia; however she was altered. Cardiology was consulted given her bradycardia.  However, upon cardiology evaluation, patient converted to afib with RVR during another episode of retching.  Cardiology feels initial episode related to vasovagal symptoms. Subsequent episode of RVR is not thought due to atropine but stress response due to retching.  Cardiology would like to place LTM to assess atrial fibrillation burden as the patient converted spontaneously to atrial fibrillation. They would like the patient to continue on metoprolol as well as apixiban for CVA prevention. Patient will f/u in cardiology clinic within one month.    Interval history: Patient feels well, offers no complaints, and the above was explained to her in great detail, >30 min. All questions answered to patient and family satisfaction.  The patient reported on ROS a rash below knee that she was told may be psoriatic and requested topical therapy. The patient also requested to go home today.    Review of Systems:  Review of Systems   Constitutional: Negative for chills and fever.   HENT: Negative for congestion and sore throat.    Eyes: Negative for photophobia, pain and discharge.   Respiratory: Negative for cough, hemoptysis, sputum production and shortness of breath.    Cardiovascular: Negative for chest pain, palpitations and leg swelling.   Gastrointestinal: Negative for abdominal pain, diarrhea, nausea and vomiting.   Genitourinary: Negative  "for dysuria and urgency.   Musculoskeletal: Negative for myalgias and neck pain.   Skin: Negative for itching and rash.   Neurological: Negative for sensory change, focal weakness and headaches.   Endo/Heme/Allergies: Negative for polydipsia. Does not bruise/bleed easily.   Psychiatric/Behavioral: Negative for depression and suicidal ideas.       OBJECTIVE:   No intake or output data in the 24 hours ending 06/27/17 0937  Vital Signs Range (Last 24H):  Temp:  [97.5 °F (36.4 °C)-98.5 °F (36.9 °C)]   Pulse:  []   Resp:  [18-21]   BP: (122-165)/(73-91)   SpO2:  [95 %-100 %]   Body mass index is 42.73 kg/m².    Objective:  General Appearance:  Comfortable, well-appearing, in no acute distress and not in pain.    Vital signs: (most recent): Blood pressure 117/71, pulse 61, temperature 98.3 °F (36.8 °C), temperature source Oral, resp. rate 18, height 5' 8" (1.727 m), weight 127.5 kg (281 lb), SpO2 97 %, not currently breastfeeding.  No fever.    Output: Producing urine and producing stool.    HEENT: Normal HEENT exam.    Lungs:  Normal effort.  Breath sounds clear to auscultation.  She is not in respiratory distress.  No rales or wheezes.    Heart: Normal rate.  Regular rhythm.  S1 normal and S2 normal.  No murmur.   Chest: Symmetric chest wall expansion.   Abdomen: Abdomen is soft.  Bowel sounds are normal.   There is no abdominal tenderness.     Extremities: Normal range of motion.  There is no deformity, effusion, dependent edema or local swelling.    Pulses: Distal pulses are intact.    Neurological: Patient is alert and oriented to person, place and time.  Normal strength.    Pupils:  Pupils are equal, round, and reactive to light.    Skin:  Warm and dry.          Medications:  Medication list was reviewed in EPIC and changes noted under Assessment/Plan and MAR.    Laboratory:  Recent Labs      06/26/17   1053   WBC  7.28   RBC  4.12   HGB  12.6   HCT  37.0   PLT  137*   MCV  90   MCH  30.6   MCHC  34.1   GRAN  " 75.3*  5.5   LYMPH  22.5  1.6   MONO  1.1*  0.1*   EOS  0.1      Recent Labs      06/26/17   1053  06/27/17   0401   GLU  233*  90   NA  140  143   K  3.1*  4.3   CL  108  108   CO2  21*  28   BUN  20  13   CREATININE  0.9  1.0   CALCIUM  8.8  9.2   ANIONGAP  11  7*   MG  1.6   --        ASSESSMENT/PLAN:     Active Hospital Problems    Diagnosis  POA    *Syncope and collapse [R55]  Yes    Bradycardia [R00.1]  Yes    Acute hypokalemia [E87.6]  Yes    New onset type 2 diabetes mellitus [E11.9]  Yes    Psoriasis [L40.9]  Yes    Thrombocytopenia, unspecified [D69.6]  Yes    Morbid obesity due to excess calories [E66.01]  Yes    Paroxysmal atrial fibrillation [I48.0]  No      Resolved Hospital Problems    Diagnosis Date Resolved POA   No resolved problems to display.      Syncope and collapse  - Likely related to vasovagal  - LTM planned with outpatient cardiology f/u    Afib with RVR  - Greatly appreciate cardiology recommendations  - LTM to assess afib burden  - Start BB and A/C (given elevated [id number])  - F/u in cardiology clinic    Psoriasis  - Mild lesion on knee  - Emollients suggested  - Offered ambulatory referral to derm, patient declined    Morbid obesity due to excess calories  - Nutrition f/u as outpatient  - Encouraged lifestyle modifications    Anticipated discharge date and disposition:   D/C today. >30 minutes spent on discharge planning and counseling alone.  Adalberto Saenz MD  [facility] Medicine

## 2017-06-27 NOTE — PLAN OF CARE
06/27/17 1000   Discharge Assessment   Assessment Type Discharge Planning Assessment   Confirmed/corrected address and phone number on facesheet? Yes   Assessment information obtained from? Patient;Caregiver   Expected Length of Stay (days) 2   Communicated expected length of stay with patient/caregiver yes   Prior to hospitilization cognitive status: Alert/Oriented   Prior to hospitalization functional status: Independent   Current cognitive status: Alert/Oriented   Current Functional Status: Independent   Arrived From home or self-care   Lives With child(oscar), adult   Able to Return to Prior Arrangements yes   Is patient able to care for self after discharge? Yes   Patient's perception of discharge disposition home or selfcare   Readmission Within The Last 30 Days no previous admission in last 30 days   Patient currently being followed by outpatient case management? No   Patient currently receives home health services? No   Does the patient currently use HME? No   Equipment Currently Used at Home none   Do you have any problems affording any of your prescribed medications? No   Does the patient have transportation to healthcare appointments? Yes   Transportation Available car   Discharge Plan A Home with family   Discharge Plan B Home with family   Patient/Family In Agreement With Plan yes   Met with patient and daughter. Patient does not have PCP and is reluctant to agree to follow up. Advised patient that she should follow up after discharge from hospital and offered to schedule her at [facility]. Daughter states that she will make sure that she follows up and will schedule her with [facility] close to her home.

## 2017-06-29 ENCOUNTER — PATIENT OUTREACH (OUTPATIENT)
Dept: ADMINISTRATIVE | Facility: CLINIC | Age: 67
End: 2017-06-29

## 2017-06-29 NOTE — PATIENT INSTRUCTIONS
Fainting: Uncertain Cause  Fainting (syncope) is a temporary loss of consciousness. Its also called passing out. It occurs when blood flow to the brain is less than normal. Near-fainting (near-syncope) is very similar to fainting, but you dont fully pass out.  Common minor causes of fainting include:  · Sudden fear  · Pain  · Nausea  · Emotional stress  · Overexertion  Suddenly standing up after sitting or lying for a long time can also cause fainting.  More serious causes of fainting include:  · Very slow or very fast heartbeat (arrhythmia)  · Other types of heart disease  · Dehydration  · Loss of blood loss  · Seizure  · Stroke  · Ruptured blood vessel in the brain  Taking too much high blood pressure medicine can also cause low blood pressure and fainting.  Your health care provider does not know the exact cause of your fainting. But the tests today did not show any of the serious causes of fainting. Sometimes you may need more tests to find out if you have a serious problem. Thats why its important to follow up with your provider as advised.  Home care  Follow these guidelines when caring for yourself at home:  · Rest today. You may go back to your normal activities when you are feeling back to normal. It is best to stay with someone who can check on you for the next 24 hours to watch for another episode of fainting.  · If you become lightheaded or dizzy, lie down right away. Or sit with your head between your knees.  · Because the provider doesnt know the exact cause of your fainting or near-fainting spell, its possible for you to have another spell without warning. Because of this, dont drive a car or operate dangerous equipment. Dont take a bath alone. Use a shower instead. Dont swim alone until your health care provider says that you are no longer in danger of having another fainting spell.  Follow-up care  Follow up with your health care provider, or as advised.  When to seek medical advice  Call  your health care provider right away if any of these occur:  · Another fainting spell thats not explained by the common causes listed above  · Pain in your chest, arm, neck, jaw, back, or abdomen  · Shortness of breath  · Severe headache or seizure  · Blood in vomit or stools (black or red color)  · Unexpected vaginal bleeding  · Your heart beats very rapidly, very slowly, or irregularly (palpitations)  Also call your provider if you have signs of stroke:  · Weakness in an arm or leg or on one side of the face  · Difficulty speaking or seeing  · Extreme drowsiness, confusion, dizziness, or fainting  Date Last Reviewed: 11/25/2014  © 9250-6165 [employer]. [address] [place] [address], [place] [address]. All rights reserved. This information is not intended as a substitute for professional medical care. Always follow your healthcare professional's instructions.

## 2017-08-01 PROCEDURE — 93272 ECG/REVIEW INTERPRET ONLY: CPT | Mod: S$GLB,,, | Performed by: INTERNAL MEDICINE

## 2017-08-24 ENCOUNTER — HOSPITAL ENCOUNTER (INPATIENT)
Facility: HOSPITAL | Age: 67
LOS: 1 days | Discharge: HOME | DRG: 312 | End: 2017-08-25
Attending: EMERGENCY MEDICINE | Admitting: HOSPITALIST
Payer: MEDICARE

## 2017-08-24 DIAGNOSIS — R55 SYNCOPE, UNSPECIFIED SYNCOPE TYPE: ICD-10-CM

## 2017-08-24 DIAGNOSIS — R55 SYNCOPE: ICD-10-CM

## 2017-08-24 DIAGNOSIS — R42 POSTURAL DIZZINESS WITH PRESYNCOPE: ICD-10-CM

## 2017-08-24 DIAGNOSIS — R55 POSTURAL DIZZINESS WITH PRESYNCOPE: ICD-10-CM

## 2017-08-24 PROBLEM — R74.01 TRANSAMINITIS: Status: ACTIVE | Noted: 2017-08-24

## 2017-08-24 LAB
ABO + RH BLD: NORMAL
ALBUMIN SERPL BCP-MCNC: 3.4 G/DL
ALP SERPL-CCNC: 89 U/L
ALT SERPL W/O P-5'-P-CCNC: 71 U/L
AMPHET+METHAMPHET UR QL: NEGATIVE
ANION GAP SERPL CALC-SCNC: 13 MMOL/L
ANION GAP SERPL CALC-SCNC: 7 MMOL/L
AST SERPL-CCNC: 65 U/L
BACTERIA #/AREA URNS AUTO: NORMAL /HPF
BARBITURATES UR QL SCN>200 NG/ML: NEGATIVE
BASOPHILS # BLD AUTO: 0.01 K/UL
BASOPHILS NFR BLD: 0.1 %
BENZODIAZ UR QL SCN>200 NG/ML: NEGATIVE
BILIRUB SERPL-MCNC: 0.7 MG/DL
BILIRUB UR QL STRIP: NEGATIVE
BLD GP AB SCN CELLS X3 SERPL QL: NORMAL
BNP SERPL-MCNC: 16 PG/ML
BUN SERPL-MCNC: 15 MG/DL
BUN SERPL-MCNC: 18 MG/DL
BZE UR QL SCN: NEGATIVE
CALCIUM SERPL-MCNC: 9.2 MG/DL
CALCIUM SERPL-MCNC: 9.5 MG/DL
CANNABINOIDS UR QL SCN: NEGATIVE
CHLORIDE SERPL-SCNC: 109 MMOL/L
CHLORIDE SERPL-SCNC: 109 MMOL/L
CLARITY UR REFRACT.AUTO: CLEAR
CO2 SERPL-SCNC: 21 MMOL/L
CO2 SERPL-SCNC: 27 MMOL/L
COLOR UR AUTO: YELLOW
CREAT SERPL-MCNC: 0.8 MG/DL
CREAT SERPL-MCNC: 1 MG/DL
CREAT UR-MCNC: 61 MG/DL
DIFFERENTIAL METHOD: ABNORMAL
EOSINOPHIL # BLD AUTO: 0 K/UL
EOSINOPHIL NFR BLD: 0.3 %
ERYTHROCYTE [DISTWIDTH] IN BLOOD BY AUTOMATED COUNT: 12.7 %
EST. GFR  (AFRICAN AMERICAN): >60 ML/MIN/1.73 M^2
EST. GFR  (AFRICAN AMERICAN): >60 ML/MIN/1.73 M^2
EST. GFR  (NON AFRICAN AMERICAN): 59.3 ML/MIN/1.73 M^2
EST. GFR  (NON AFRICAN AMERICAN): >60 ML/MIN/1.73 M^2
ETHANOL UR-MCNC: <10 MG/DL
GLUCOSE SERPL-MCNC: 101 MG/DL
GLUCOSE SERPL-MCNC: 163 MG/DL
GLUCOSE UR QL STRIP: NEGATIVE
HCT VFR BLD AUTO: 37.7 %
HGB BLD-MCNC: 12.9 G/DL
HGB UR QL STRIP: ABNORMAL
INR PPP: 1
KETONES UR QL STRIP: ABNORMAL
LEUKOCYTE ESTERASE UR QL STRIP: NEGATIVE
LIPASE SERPL-CCNC: 25 U/L
LYMPHOCYTES # BLD AUTO: 1.1 K/UL
LYMPHOCYTES NFR BLD: 10.2 %
MAGNESIUM SERPL-MCNC: 1.6 MG/DL
MCH RBC QN AUTO: 30.7 PG
MCHC RBC AUTO-ENTMCNC: 34.2 G/DL
MCV RBC AUTO: 90 FL
METHADONE UR QL SCN>300 NG/ML: NEGATIVE
MICROSCOPIC COMMENT: NORMAL
MONOCYTES # BLD AUTO: 0.3 K/UL
MONOCYTES NFR BLD: 2.9 %
NEUTROPHILS # BLD AUTO: 9.1 K/UL
NEUTROPHILS NFR BLD: 86.2 %
NITRITE UR QL STRIP: NEGATIVE
OPIATES UR QL SCN: NEGATIVE
PCP UR QL SCN>25 NG/ML: NEGATIVE
PH UR STRIP: 6 [PH] (ref 5–8)
PHOSPHATE SERPL-MCNC: 3 MG/DL
PLATELET # BLD AUTO: 145 K/UL
PMV BLD AUTO: 11 FL
POCT GLUCOSE: 111 MG/DL (ref 70–110)
POTASSIUM SERPL-SCNC: 2.9 MMOL/L
POTASSIUM SERPL-SCNC: 4.5 MMOL/L
PROT SERPL-MCNC: 7.3 G/DL
PROT UR QL STRIP: NEGATIVE
PROTHROMBIN TIME: 11.1 SEC
RBC # BLD AUTO: 4.2 M/UL
RBC #/AREA URNS AUTO: 3 /HPF (ref 0–4)
SODIUM SERPL-SCNC: 143 MMOL/L
SODIUM SERPL-SCNC: 143 MMOL/L
SP GR UR STRIP: 1.01 (ref 1–1.03)
SQUAMOUS #/AREA URNS AUTO: 1 /HPF
TOXICOLOGY INFORMATION: NORMAL
TROPONIN I SERPL DL<=0.01 NG/ML-MCNC: 0.01 NG/ML
TROPONIN I SERPL DL<=0.01 NG/ML-MCNC: 0.04 NG/ML
TROPONIN I SERPL DL<=0.01 NG/ML-MCNC: 0.07 NG/ML
URN SPEC COLLECT METH UR: ABNORMAL
UROBILINOGEN UR STRIP-ACNC: ABNORMAL EU/DL
WBC # BLD AUTO: 10.55 K/UL
WBC #/AREA URNS AUTO: 2 /HPF (ref 0–5)

## 2017-08-24 PROCEDURE — 99223 1ST HOSP IP/OBS HIGH 75: CPT | Mod: ,,, | Performed by: PSYCHIATRY & NEUROLOGY

## 2017-08-24 PROCEDURE — 95819 EEG AWAKE AND ASLEEP: CPT | Mod: 26,,, | Performed by: PSYCHIATRY & NEUROLOGY

## 2017-08-24 PROCEDURE — 63600175 PHARM REV CODE 636 W HCPCS: Performed by: EMERGENCY MEDICINE

## 2017-08-24 PROCEDURE — 83690 ASSAY OF LIPASE: CPT

## 2017-08-24 PROCEDURE — A9585 GADOBUTROL INJECTION: HCPCS | Performed by: HOSPITALIST

## 2017-08-24 PROCEDURE — 99284 EMERGENCY DEPT VISIT MOD MDM: CPT | Mod: ,,, | Performed by: EMERGENCY MEDICINE

## 2017-08-24 PROCEDURE — 80048 BASIC METABOLIC PNL TOTAL CA: CPT

## 2017-08-24 PROCEDURE — 84484 ASSAY OF TROPONIN QUANT: CPT

## 2017-08-24 PROCEDURE — 82962 GLUCOSE BLOOD TEST: CPT

## 2017-08-24 PROCEDURE — 83880 ASSAY OF NATRIURETIC PEPTIDE: CPT

## 2017-08-24 PROCEDURE — 99285 EMERGENCY DEPT VISIT HI MDM: CPT | Mod: 25

## 2017-08-24 PROCEDURE — 84484 ASSAY OF TROPONIN QUANT: CPT | Mod: 91

## 2017-08-24 PROCEDURE — 85610 PROTHROMBIN TIME: CPT

## 2017-08-24 PROCEDURE — 84100 ASSAY OF PHOSPHORUS: CPT

## 2017-08-24 PROCEDURE — 93005 ELECTROCARDIOGRAM TRACING: CPT

## 2017-08-24 PROCEDURE — 85025 COMPLETE CBC W/AUTO DIFF WBC: CPT

## 2017-08-24 PROCEDURE — 81001 URINALYSIS AUTO W/SCOPE: CPT

## 2017-08-24 PROCEDURE — 80053 COMPREHEN METABOLIC PANEL: CPT

## 2017-08-24 PROCEDURE — 80307 DRUG TEST PRSMV CHEM ANLYZR: CPT

## 2017-08-24 PROCEDURE — 25500020 PHARM REV CODE 255: Performed by: HOSPITALIST

## 2017-08-24 PROCEDURE — 25000003 PHARM REV CODE 250: Performed by: HOSPITALIST

## 2017-08-24 PROCEDURE — 99223 1ST HOSP IP/OBS HIGH 75: CPT | Mod: AI,,, | Performed by: HOSPITALIST

## 2017-08-24 PROCEDURE — 86850 RBC ANTIBODY SCREEN: CPT

## 2017-08-24 PROCEDURE — 96372 THER/PROPH/DIAG INJ SC/IM: CPT

## 2017-08-24 PROCEDURE — 11000001 HC ACUTE MED/SURG PRIVATE ROOM

## 2017-08-24 PROCEDURE — 86900 BLOOD TYPING SEROLOGIC ABO: CPT

## 2017-08-24 PROCEDURE — 95819 EEG AWAKE AND ASLEEP: CPT

## 2017-08-24 PROCEDURE — 93010 ELECTROCARDIOGRAM REPORT: CPT | Mod: ,,, | Performed by: INTERNAL MEDICINE

## 2017-08-24 PROCEDURE — 96374 THER/PROPH/DIAG INJ IV PUSH: CPT

## 2017-08-24 PROCEDURE — 83735 ASSAY OF MAGNESIUM: CPT

## 2017-08-24 PROCEDURE — 25000003 PHARM REV CODE 250: Performed by: EMERGENCY MEDICINE

## 2017-08-24 RX ORDER — IBUPROFEN 200 MG
24 TABLET ORAL
Status: DISCONTINUED | OUTPATIENT
Start: 2017-08-24 | End: 2017-08-25 | Stop reason: HOSPADM

## 2017-08-24 RX ORDER — POTASSIUM CHLORIDE 750 MG/1
30 CAPSULE, EXTENDED RELEASE ORAL ONCE
Status: COMPLETED | OUTPATIENT
Start: 2017-08-24 | End: 2017-08-24

## 2017-08-24 RX ORDER — METOPROLOL SUCCINATE 50 MG/1
50 TABLET, EXTENDED RELEASE ORAL DAILY
Status: DISCONTINUED | OUTPATIENT
Start: 2017-08-25 | End: 2017-08-25 | Stop reason: HOSPADM

## 2017-08-24 RX ORDER — INSULIN ASPART 100 [IU]/ML
0-5 INJECTION, SOLUTION INTRAVENOUS; SUBCUTANEOUS DAILY
Status: DISCONTINUED | OUTPATIENT
Start: 2017-08-24 | End: 2017-08-25 | Stop reason: HOSPADM

## 2017-08-24 RX ORDER — IBUPROFEN 200 MG
16 TABLET ORAL
Status: DISCONTINUED | OUTPATIENT
Start: 2017-08-24 | End: 2017-08-25 | Stop reason: HOSPADM

## 2017-08-24 RX ORDER — GADOBUTROL 604.72 MG/ML
10 INJECTION INTRAVENOUS
Status: COMPLETED | OUTPATIENT
Start: 2017-08-24 | End: 2017-08-24

## 2017-08-24 RX ORDER — GLUCAGON 1 MG
1 KIT INJECTION
Status: DISCONTINUED | OUTPATIENT
Start: 2017-08-24 | End: 2017-08-25 | Stop reason: HOSPADM

## 2017-08-24 RX ORDER — ACETAMINOPHEN 325 MG/1
650 TABLET ORAL EVERY 6 HOURS PRN
Status: DISCONTINUED | OUTPATIENT
Start: 2017-08-24 | End: 2017-08-25 | Stop reason: HOSPADM

## 2017-08-24 RX ORDER — ONDANSETRON 2 MG/ML
4 INJECTION INTRAMUSCULAR; INTRAVENOUS EVERY 12 HOURS PRN
Status: DISCONTINUED | OUTPATIENT
Start: 2017-08-24 | End: 2017-08-25 | Stop reason: HOSPADM

## 2017-08-24 RX ORDER — ONDANSETRON 2 MG/ML
4 INJECTION INTRAMUSCULAR; INTRAVENOUS
Status: COMPLETED | OUTPATIENT
Start: 2017-08-24 | End: 2017-08-24

## 2017-08-24 RX ADMIN — POTASSIUM BICARBONATE 50 MEQ: 25 TABLET, EFFERVESCENT ORAL at 12:08

## 2017-08-24 RX ADMIN — POTASSIUM CHLORIDE 30 MEQ: 750 CAPSULE, EXTENDED RELEASE ORAL at 11:08

## 2017-08-24 RX ADMIN — ONDANSETRON 4 MG: 2 INJECTION INTRAMUSCULAR; INTRAVENOUS at 06:08

## 2017-08-24 RX ADMIN — GADOBUTROL 10 ML: 604.72 INJECTION INTRAVENOUS at 09:08

## 2017-08-24 NOTE — ED PROVIDER NOTES
Encounter Date: 8/24/2017    SCRIBE #1 NOTE: I, Morenita Figueroa, am scribing for, and in the presence of,  Dr. Carrion. I have scribed the following portions of the note - Other sections scribed: CXR reading, CT Head reading.       History     Chief Complaint   Patient presents with    Chest Pain     N/V Chest discomfort. 324 ASA, 1 SL Nitro.      66-year-old female was brought in by paramedic ambulance with a chief complaint of feeling poorly this morning; family member who called the paramedics stated that she was found earlier this morning staring ahead.  Patient feeling lightheaded and nauseated patient apparently vomited en route to the hospital and presents with vomitus on mouth neck and clothing.  Patient is alert and responsive though slow to answer questions.  Paramedics stated at the scene she was likewise slow to respond and had to be cajoled to move off the sofa and onto the stretcher.  At the scene she appeared to be neurologically intact as she appears likewise here denies any headache states she just feels like she is going to pass out; denies chest pain, fever, abdominal pain no recent diarrhea  Patient had recent workup for syncope and collapse also was noted to be hypokalemic at that time.  Reading through the description of her presenting symptoms then, today is very similar to that.          Review of patient's allergies indicates:  No Known Allergies  Past Medical History:   Diagnosis Date    DM (diabetes mellitus)     Gout      No past surgical history on file.  No family history on file.  Social History   Substance Use Topics    Smoking status: Never Smoker    Smokeless tobacco: Never Used    Alcohol use No     Review of Systems   Constitutional: Positive for activity change and diaphoresis.   HENT: Negative for trouble swallowing and voice change.    Eyes: Negative for visual disturbance.   Respiratory: Negative for chest tightness, shortness of breath and wheezing.     Cardiovascular: Negative for chest pain and palpitations.   Gastrointestinal: Positive for nausea and vomiting.   Genitourinary: Negative for hematuria.   Musculoskeletal: Negative for myalgias, neck pain and neck stiffness.   Skin: Negative for pallor and rash.   Neurological: Positive for weakness and light-headedness. Negative for seizures and syncope.   Hematological: Does not bruise/bleed easily.       Physical Exam     Initial Vitals   BP Pulse Resp Temp SpO2   -- -- -- -- --      MAP       --         Physical Exam    Constitutional: She is cooperative. She appears ill. No distress.   Slow to respond and must be irritated to answer appropriately   HENT:   Head: Normocephalic and atraumatic.   Mouth/Throat: Oropharynx is clear and moist.   Eyes: Conjunctivae, EOM and lids are normal.   Neck: Neck supple.   Cardiovascular: Normal rate and regular rhythm. Exam reveals distant heart sounds.    Pulmonary/Chest: Breath sounds normal. No accessory muscle usage. No tachypnea. No respiratory distress.   Abdominal: There is no tenderness.   obese   Musculoskeletal: Normal range of motion.   Neurological: She is alert. She has normal strength. No cranial nerve deficit. GCS eye subscore is 4. GCS verbal subscore is 5. GCS motor subscore is 6.   Skin: Skin is warm and dry.         ED Course   Procedures  Labs Reviewed   CBC W/ AUTO DIFFERENTIAL - Abnormal; Notable for the following:        Result Value    Platelets 145 (*)     Gran # 9.1 (*)     Gran% 86.2 (*)     Lymph% 10.2 (*)     Mono% 2.9 (*)     All other components within normal limits   COMPREHENSIVE METABOLIC PANEL - Abnormal; Notable for the following:     Potassium 2.9 (*)     CO2 21 (*)     Glucose 163 (*)     Albumin 3.4 (*)     AST 65 (*)     ALT 71 (*)     eGFR if non  59.3 (*)     All other components within normal limits   PROTIME-INR   TROPONIN I   B-TYPE NATRIURETIC PEPTIDE   MAGNESIUM   PHOSPHORUS   LIPASE   URINALYSIS   URINALYSIS,  REFLEX TO URINE CULTURE   TYPE & SCREEN     EKG Readings: (Independently Interpreted)   Initial Reading: No STEMI. Rhythm: Normal Sinus Rhythm. Heart Rate: 77. Axis: Left Axis Deviation. Other Impression: LVH       X-Rays:   Independently Interpreted Readings:   Chest X-Ray: Poor quality X-Ray. Will re-do with a PA and lateral CXR.   Head CT: No acute intracranial process.     Medical Decision Making:   History:   Old Medical Records: I decided to obtain old medical records.  Initial Assessment:   66-year-old female presenting to the ED because of lightheadedness weakness about pass out associated with nausea vomiting; family member noted the patient to be laying on the sofa and staring there is no history of seizure activity.  Patient just complaining of severe weakness like she is going to pass out denies vertigo denies chest pain shortness of breath of disease caught behind a long very long.    Differential Diagnosis:   Be versus near syncope, cardiac dysfunction arrhythmia, doubt neurologic      Independently Interpreted Test(s):   I have ordered and independently interpreted X-rays - see prior notes.  Clinical Tests:   Lab Tests: Ordered and Reviewed  Radiological Study: Ordered and Reviewed  Medical Tests: Ordered and Reviewed  ED Management:  Patient will be evaluated for possible syncope vs seizure she will have labs drawn EKG cardiac workup will give Zofran for nausea vomiting with results of workup  Other:   I have discussed this case with another health care provider.            Scribe Attestation:   Scribe #1: I performed the above scribed service and the documentation accurately describes the services I performed. I attest to the accuracy of the note.    Attending Attestation:           Physician Attestation for Scribe:  Physician Attestation Statement for Scribe #1: I, Dr. Carrion, reviewed documentation, as scribed by Morenita Figueroa in my presence, and it is both accurate and complete.          Attending ED Notes:   67-year-old female was brought in by paramedic ambulance because of feeling weak and dizzy is also a question of a possible syncopal event or possible seizure patient was aggressively evaluated in the ED with bloodwork EKG and imaging.  Patient's nausea was controlled with Zofran or patient continues to appear to be quite weak and dizzy and was felt that the patient would be better served if she was placed in hospital for further evaluation and the patient is admitted to the internal medicine service for further care and evaluation            ED Course     Clinical Impression:   The encounter diagnosis was Syncope.    Disposition:   Disposition: Admitted  Condition: Serious                        Mando Carrion MD  12/11/17 0940

## 2017-08-24 NOTE — PROVIDER PROGRESS NOTES - EMERGENCY DEPT.
Encounter Date: 8/24/2017    ED Physician Progress Notes       SCRIBE NOTE: I, Morenita Figueroa, am scribing for, and in the presence of,  Dr. Carrion .  Physician Statement: I, Dr. Carrion , personally performed the services described in this documentation as scribed by Morenita Figueroa in my presence, and it is both accurate and complete.     Physician Note:   7.48AM I spoke with the patient's brother, who stated that he was sleeping in a recliner near his sister and heard some loud snoring. He noted her to have her eyes rolled back and that she was unresponsive. The brother describes the patient had apparently awaken and become oriented, but then had another episode. This happened several times. On examining the patient, she had bitten the left side of her tongue. With this in mind, we will pursue this. It could still be a cardiac issue but we have expanded it to a neuro issue. We will obtain a CT scan and do appropriate consultations.

## 2017-08-24 NOTE — CONSULTS
"Cardiology Initial Consult Note    8/24/2017    Reason for Consult: Pre-syncope    SUBJECTIVE  Danita Hall is a 66 y.o. female with a history significant for AF on Eliquis and metoprolol, DM.  This morning around 5 AM, she said she had been in and out of sleep on the couch.  She was half awake on the couch, her brother heard her and thought she was snoring.  He said that she had her tongue out of her mouth but her eyes were open. She kind of remembers him calling her and shaking her, but she said it felt as if she could not respond, and she felt that if she sat up she was going to pass out.  She did have some sweats and asked for the fan because she had some sweats, and felt that the cool air was keeping her from passing out when she was lightheaded.  She denies diarrhea, some mild urinary incontinence.  She says that when she finally sat up, she said she just wanted to sleep. She had some mild palpitations at one point, but denies chest pain, dyspnea, orthopnea.  She thinks her period of unresponsiveness may have lasted 30 seconds.  She is able to care for herself, do her ADLs and IADLs without chest pain or dyspnea, but does not do much on her own.      Of note, she says this is similar to her event in June, for which she was admitted and thought to have pre-syncope from vagal event.  At that time, she was sitting in the chair and stood up "to say something."  She felt a squeezing sensation that worked its way down and then got pre-syncopal and passed out, along with diaphoresis.  She has had episodes like this in the past, a total of maybe 8 total episodes over the past 4 years.  These episodes have seemed to be more frequently after she eats or after drinking milk.  During her last episode, she was diagnosed with atrial fibrillation and started on metoprolol and Eliquis, was discharged with an event monitor which showed sinus rhythm with IVCD and no arrhythmia or bradycardia.      She does get some " dependent edema.  She has no diagnosis of SUAD.   ?  Review of Systems   Constitution: Negative for chills, fever  HENT: Negative.    Eyes: Negative.    Cardiovascular: As per HPI.   Respiratory: Negative for shortness of breath. Negative for cough.    Endocrine: Negative.    Hematologic/Lymphatic: Negative.    Skin: Negative for color change and rash.   Musculoskeletal: Negative.    Gastrointestinal: Positive for nausea.  Negative for abdominal pain, change in bowel habit, diarrhea.    Genitourinary: Negative.    Neurological: As per HPI.   Psychiatric/Behavioral: Negative for altered mental status.     OBJECTIVE  Current Facility-Administered Medications   Medication Dose Route Frequency Provider Last Rate Last Dose    acetaminophen tablet 650 mg  650 mg Oral Q6H PRN Yony Jackson MD        dextrose 50% injection 12.5 g  12.5 g Intravenous PRN Yony Jackson MD        dextrose 50% injection 25 g  25 g Intravenous PRN Yony Jackson MD        glucagon (human recombinant) injection 1 mg  1 mg Intramuscular PRN Yony Jackson MD        glucose chewable tablet 16 g  16 g Oral PRN Yony Jackson MD        glucose chewable tablet 24 g  24 g Oral PRN Yony Jackson MD        insulin aspart pen 0-5 Units  0-5 Units Subcutaneous Daily Yony Jackson MD        ondansetron injection 4 mg  4 mg Intravenous Q12H PRN Yony Jackson MD         Current Outpatient Prescriptions   Medication Sig Dispense Refill    apixaban 5 mg Tab Take 1 tablet (5 mg total) by mouth 2 (two) times daily. 60 tablet 1    metoprolol succinate (TOPROL-XL) 50 MG 24 hr tablet Take 1 tablet (50 mg total) by mouth once daily. 30 tablet 1       Past Medical History:   Diagnosis Date    DM (diabetes mellitus)     Gout        No past surgical history on file.    Review of patient's allergies indicates:  No Known Allergies    No family history on file.    Social History     Social History    Marital status: Single      Spouse name: N/A    Number of children: N/A    Years of education: N/A     Social History Main Topics    Smoking status: Never Smoker    Smokeless tobacco: Never Used    Alcohol use No    Drug use: No    Sexual activity: Not Currently     Other Topics Concern    None     Social History Narrative    None       Physical Exam  Vitals: BP (!) 144/69   Pulse 60   Resp 18   SpO2 (!) 79%   Breastfeeding? No   Gen: NAD, obese female lying in bed  Head/Eyes/Ears/Nose: NCAT, MMM   Neck: No JVD  Lung: CTAB, eupneic  Heart: Normal S1/S2, regular rate and rhythm  Abdomen: Soft, obese, NT/ND, NABS  Extremities: Trace LE edema bilaterally  Skin: Normal color and turgor  Neuro: AAOx3      Recent Labs  Lab 08/24/17  0641 08/24/17  1624    143   K 2.9* 4.5   CO2 21* 27    109   BUN 18 15   CREATININE 1.0 0.8   * 101   CALCIUM 9.2 9.5   ALT 71*  --    AST 65*  --    ALKPHOS 89  --    BILITOT 0.7  --    PROT 7.3  --    ALBUMIN 3.4*  --          Recent Labs  Lab 08/24/17  0641   WBC 10.55   HGB 12.9   HCT 37.7   *   MCV 90       Recent Labs  Lab 08/24/17  0641   INR 1.0       Recent Labs  Lab 08/24/17  0641 08/24/17  1234   TROPONINI 0.015 0.070*        Results  TTE  1 - Normal left ventricular systolic function (EF 60-65%).     2 - Normal left ventricular diastolic function.     3 - Normal right ventricular systolic function .     4 - The estimated PA systolic pressure is greater than 23 mmHg.     EKG   Normal sinus rhythm, LVH, IVCD, left axis  ?  ASSESSMENT AND PLAN  66 y.o. female with AF on Eliquis and metoprolol (diagnosed after a vagal event for which she was given atropine).  She was followed with an event monitor for 30 days, rhythm was sinus and IVCD with only PACs.  Her symptoms are not consistent with bradycardia mediated, but she has had 8 episodes of this over the last several years.  Event monitor was not remarkable, however no symptoms during this time.  She does have IVCD, no  structural heart disease on echocardiogram.  She is in sinus rhythm on admit.  She is hypokalemic on admission. Her edema is dependent, she has no symptoms of CHF.     She demonstrated chronotropic competence in the ER, HR easily picked up to 90's with exertion in the bed after only 15-20 seconds.  She denies chest pain or discomfort with this.  Though her 2nd troponin is mildly elevated, she has no signs or symptoms of ACS, and initial troponin was negative.  Would repeat, if negative, the 0.07 is likely an erroneous lab.     Recommendations:   - Monitor on telemetry  - Replete K - Admitted with hypokalemia  - Trend troponin, unsure why 2nd troponin was abnormal, but no signs/sx of ACS.  She got an aspirin 325 mg in ER.  If trop uptrending, would treat for ACS.  If 3rd trop is normal, then the 2nd elevated trop is likely spurious.    - Continue Eliquis and metoprolol, monitor on telemetry  - f/u with EP on outpatient, could consider implantable loop recorder on d/c given her recurrent symptoms  - Sleep study for SUAD    Jovani Rojas MD  Cardiology Fellow    Addendum: 3rd troponin 0.04, no clinical signs of ACS. Would get a PET stress as outpatient.

## 2017-08-24 NOTE — ED NOTES
Pt resting quietly on stretcher; remains on continuous cardiac and pulse ox monitoring with non-invasive blood pressure to cycle every 30 minutes.  VS stable; sinus bradycardia with a HR in the upper 50's noted. Bed locked in lowest position; side rails up and locked x 2; call light, bedside table, and personal belongings within reach.  Pt denies needs or complaints at this time; will continue to monitor pt.

## 2017-08-24 NOTE — ED NOTES
Pt ambulatory to bathroom assisted by nurse; steady gait noted.  Urine specimen obtained and sent to lab.  Pt returned to room 25 without incidence; replaced on continuous cardiac and pulse ox monitoring with blood pressure to cycle every 30 minutes.  Bed locked in lowest position; side rails up and locked x 2; call light, bedside table, and personal belongings within reach.  Pt denies needs or complaints at this time; will continue to monitor pt.

## 2017-08-24 NOTE — ED TRIAGE NOTES
Pt presents to ED c/o CP, nausea and vomiting that started this am. Pt stated that she also felt lightheaded and felt like she was going to pass out. Pt currently denies CP, SOB.

## 2017-08-24 NOTE — SUBJECTIVE & OBJECTIVE
Past Medical History:   Diagnosis Date    DM (diabetes mellitus)     Gout      No past surgical history on file.    Review of patient's allergies indicates:  No Known Allergies    No current facility-administered medications on file prior to encounter.      Current Outpatient Prescriptions on File Prior to Encounter   Medication Sig    apixaban 5 mg Tab Take 1 tablet (5 mg total) by mouth 2 (two) times daily.    metoprolol succinate (TOPROL-XL) 50 MG 24 hr tablet Take 1 tablet (50 mg total) by mouth once daily.     Family History     None        Social History Main Topics    Smoking status: Never Smoker    Smokeless tobacco: Never Used    Alcohol use No    Drug use: No    Sexual activity: Not Currently     Review of Systems   Constitutional: Positive for activity change and diaphoresis. Negative for fatigue, fever and unexpected weight change.   HENT: Negative for tinnitus, trouble swallowing and voice change.    Eyes: Positive for visual disturbance. Negative for photophobia, pain and redness.   Respiratory: Negative for shortness of breath.    Cardiovascular: Negative for chest pain and palpitations.   Gastrointestinal: Positive for nausea. Negative for abdominal pain and vomiting.   Musculoskeletal: Negative for gait problem, neck pain and neck stiffness.   Allergic/Immunologic: Negative for immunocompromised state.   Neurological: Positive for dizziness and light-headedness. Negative for tremors, seizures, facial asymmetry, speech difficulty, weakness, numbness and headaches.     Objective:     Vital Signs (Most Recent):  Pulse: 60 (08/24/17 1639)  Resp: 18 (08/24/17 1416)  BP: (!) 144/69 (08/24/17 1639)  SpO2: (!) 79 % (08/24/17 1639) Vital Signs (24h Range):  Pulse:  [52-68] 60  Resp:  [16-18] 18  SpO2:  [79 %-99 %] 79 %  BP: (110-173)/(55-95) 144/69        There is no height or weight on file to calculate BMI.    Physical Exam   Constitutional: She is oriented to person, place, and time. She appears  well-developed and well-nourished. No distress.   HENT:   Head: Normocephalic and atraumatic.   Eyes: Conjunctivae and EOM are normal. Pupils are equal, round, and reactive to light.   Neck: Normal range of motion. Neck supple.   Pulmonary/Chest: Effort normal.   Musculoskeletal: Normal range of motion.   Neurological: She is oriented to person, place, and time. She has a normal Finger-Nose-Finger Test.   Reflex Scores:       Bicep reflexes are 2+ on the right side and 2+ on the left side.       Patellar reflexes are 2+ on the right side and 2+ on the left side.  Skin: She is not diaphoretic.   Psychiatric: Her speech is normal.     NEUROLOGICAL EXAMINATION:     MENTAL STATUS   Oriented to person, place, and time.   Recall at 5 minutes: recalls 3 of 3 objects. Follows 2 step commands.   Attention: normal. Concentration: normal.   Speech: speech is normal   Level of consciousness: alert  Knowledge: good.   Able to repeat. Normal comprehension. Praxis: normal     CRANIAL NERVES     CN III, IV, VI   Pupils are equal, round, and reactive to light.  Extraocular motions are normal.   Right pupil: Shape: regular.   Left pupil: Shape: regular. Reactivity: brisk.   Nystagmus: none   Diplopia: none  Ophthalmoparesis: none    CN V   Facial sensation intact.     CN VII   Facial expression full, symmetric.     CN VIII   CN VIII normal.     CN IX, X   CN IX normal.   CN X normal.     CN XI   CN XI normal.     CN XII   CN XII normal.     MOTOR EXAM   Muscle bulk: normal  Overall muscle tone: normal  Right arm pronator drift: absent  Left arm pronator drift: absent    Strength   Right deltoid: 5/5  Left deltoid: 5/5  Right biceps: 5/5  Left biceps: 5/5  Right triceps: 5/5  Left triceps: 5/5  Right wrist flexion: 5/5  Left wrist flexion: 5/5  Right wrist extension: 5/5  Left wrist extension: 5/5  Right iliopsoas: 5/5  Left iliopsoas: 5/5  Right quadriceps: 5/5  Left quadriceps: 5/5  Right anterior tibial: 5/5  Left anterior tibial:  5/5  Right peroneal: 5/5  Left peroneal: 5/5    REFLEXES     Reflexes   Right biceps: 2+  Left biceps: 2+  Right patellar: 2+  Left patellar: 2+  Right plantar: normal  Left plantar: normal  Right ankle clonus: absent  Left ankle clonus: absent    SENSORY EXAM   Light touch normal.   Right leg vibration: decreased from ankle  Left leg vibration: decreased from ankle    GAIT AND COORDINATION      Coordination   Finger to nose coordination: normal    Tremor   Resting tremor: absent    Significant Labs:   Hemoglobin A1c: No results for input(s): HGBA1C in the last 720 hours.  CBC:   Recent Labs  Lab 08/24/17  0641   WBC 10.55   HGB 12.9   HCT 37.7   *     CMP:   Recent Labs  Lab 08/24/17  0641   *      K 2.9*      CO2 21*   BUN 18   CREATININE 1.0   CALCIUM 9.2   MG 1.6   PROT 7.3   ALBUMIN 3.4*   BILITOT 0.7   ALKPHOS 89   AST 65*   ALT 71*   ANIONGAP 13   EGFRNONAA 59.3*     Inflammatory Markers: No results for input(s): SEDRATE, CRP, PROCAL in the last 48 hours.  Urine Culture: No results for input(s): LABURIN in the last 48 hours.  Urine Studies: No results for input(s): COLORU, APPEARANCEUA, PHUR, SPECGRAV, PROTEINUA, GLUCUA, KETONESU, BILIRUBINUA, OCCULTUA, NITRITE, UROBILINOGEN, LEUKOCYTESUR, RBCUA, WBCUA, BACTERIA, SQUAMEPITHEL, HYALINECASTS in the last 48 hours.    Invalid input(s): WRIGHTSUR  All pertinent lab results from the past 24 hours have been reviewed.    Routine EEG (8/24/17):  Prelim per Dr. Bennett-Normal without evidence of ictal discharges     Significant Imaging: I have reviewed and interpreted all pertinent imaging results/findings within the past 24 hours.     CT Head WO contrast (8/24/17):  Unremarkable slightly motion limited noncontrast CT head specifically without evidence for acute intracranial hemorrhage or significant new abnormal parenchymal attenuation.

## 2017-08-24 NOTE — PROCEDURES
DATE OF PROCEDURE:  08/24/2017    EEG #:  FH-.    REFERRING PHYSICIAN:  Dr. Jackson.    This EEG was performed to assess for evidence of underlying epilepsy.    ELECTROENCEPHALOGRAM REPORT     METHODOLOGY:  Electroencephalographic (EEG) recording is recorded with   electrodes placed according to the International 10-20 placement system.  Thirty   two (32) channels of digital signal (sampling rate of 512/sec), including T1   and T2, were simultaneously recorded from the scalp and may include EKG, EMG,   and/or eye monitors.  Recording band pass was 0.1 to 512 Hz.  Digital video   recording of the patient is simultaneously recorded with the EEG.  The patient   is instructed to report clinical symptoms which may occur during the recording   session.  EEG and video recording are stored and archived in digital format.    Activation procedures, which include photic stimulation, hyperventilation and   instructing patients to perform simple tasks, are done in selected patients  The EEG is displayed on a monitor screen and can be reviewed using different   montages.  Computer assisted-analysis is employed to detect spike and   electrographic seizure activity.   The entire record is submitted for computer   analysis.  The entire recording is visually reviewed, and the times identified   by computer analysis as being spikes or seizures are reviewed again.    Compressed spectral analysis (CSA) is also performed on the activity recorded   from each individual channel.  This is displayed as a power display of   frequencies from 0 to 30 Hz over time.   The CSA is reviewed looking for   asymmetries in power between homologous areas of the scalp, then compared with   the original EEG recording.    [employer] software was also utilized in the review of this study.  This software   suite analyzes the EEG recording in multiple domains.  Coherence and rhythmicity   are computed to identify EEG sections which may contain organized  seizures.    Each channel undergoes analysis to detect the presence of spike and sharp waves   which have special and morphological characteristics of epileptic activity.  The   routine EEG recording is converted from special into frequency domain.  This is   then displayed comparing homologous areas to identify areas of significant   asymmetry.  Algorithm to identify non-cortically generated artifact is used to   separate artifact from the EEG.      EEG FINDINGS:  The recording was obtained with a number of standard bipolar and   referential montages during wakefulness, drowsiness and sleep.  In the alert   state, the posterior background rhythm was a symmetric, well-modulated 10 to 11   Hz alpha rhythm, which reacted symmetrically to eye opening.  Activation   procedures were not performed.  During drowsiness, the background rhythm waxed   and waned and there were periods of slowing.  During stage II sleep, symmetric V   waves, K complexes and sleep spindles were noted.  There were no focal   abnormalities.  There were no interictal epileptiform abnormalities and no   clinical or electrographic seizures were recorded.  The EKG channel revealed   sinus rhythm.    IMPRESSION:  This is a normal EEG during wakefulness, drowsiness and sleep.    CLINICAL CORRELATION:  The patient is a 66-year-old female who is evaluated for   history of syncope.  The patient is currently not maintained on any anti-seizure   medications.  This is a normal EEG during wakefulness, drowsiness and sleep.    There is no evidence for either cortical dysfunction nor an epileptic process on   this recording.  No seizures were recorded during this study.      FAK/HN  dd: 08/24/2017 16:14:15 (CDT)  td: 08/24/2017 17:02:00 (CDT)  Doc ID   #[id number]  Job ID #[id number]    CC:

## 2017-08-24 NOTE — H&P
"[facility] Hospital Medicine  History & Physical    Patient Name: Danita Hall  MRN: [id number]  Admission Date: 8/24/2017  Attending Physician: Yony Jackson MD  Primary Care Provider: Primary Doctor [facility] Medicine Team: Networked reference to record PCT  Yony Jackson MD     Patient information was obtained from patient, past medical records and ER records.     Subjective:     Principal Problem:Syncope    Chief Complaint:   Chief Complaint   Patient presents with    Chest Pain     N/V Chest discomfort. 324 ASA, 1 SL Nitro.         HPI: Danita Hall is a 66 y.o. female with a PMH of Paroxysmal aatrial fibrillationwho presented to the ED on 8/24/2017 diagnosed with  significant PMHx presented to ED 8/24/17 after black out episode this morning. Patient was sleeping then woke up today am feeling like she was about to pass out. She felt aware of her surroundings and was able to hear her brother talking to her. She felt Funny sensation lightheadedness,, "Sinking her into a hole" and Had leaking of urine. Denies fecal incontinence. Patient's brother witnessed this episode on his way to bathroom, found Her staring with minimal response, labored breathing- making snorting noise. Patient mentions that she was awake and able to hear conversation. Unable to tell if she bit her tongue but found to have some mucosal tongue injury on left lateral tongue. The episode was associated with shortness of breath, subsequently EMS was called. Patient experienced nausea and had an episode of vomiting en route to the emergency department.     . She reports Occasional episodes of lightheadedness, dizziness, tunnel vision, diaphoresis over the past few years, Attributes mostly with food allergies. She had a similar episode in June and was admitted at [facility] 6/26-6/27/17 for dizziness, nausea, vomiting and confusion. She was bradycardic and given atropine then went into Afib with RVR and started on " metoprolol and apixaban and sent home with 30 day event monitor - Which showed event monitor which showed sinus rhythm with IVCD and no arrhythmia or bradycardia.   with plan to follow-up with Cardiology. She was evaluated by Cardiology during June admission and they felt episode was 2/2 vasovagal syncope. She Missed her appointment with cardiology two weeks back . At baseline, She walks unassisted at baseline and lives with her brother. She is concerned about a possible food allergy and reports episodes happen after consuming milk, even powered milk in her coffee and cooking oils. No hx of seizures, head trauma      Baseline, ADL/IADL independent    Past Medical History:   Diagnosis Date    DM (diabetes mellitus)     Gout        No past surgical history on file.    Review of patient's allergies indicates:  No Known Allergies    No current facility-administered medications on file prior to encounter.      Current Outpatient Prescriptions on File Prior to Encounter   Medication Sig    apixaban 5 mg Tab Take 1 tablet (5 mg total) by mouth 2 (two) times daily.    metoprolol succinate (TOPROL-XL) 50 MG 24 hr tablet Take 1 tablet (50 mg total) by mouth once daily.     Family History     None        Social History Main Topics    Smoking status: Never Smoker    Smokeless tobacco: Never Used    Alcohol use No    Drug use: No    Sexual activity: Not Currently     Review of Systems   Constitutional: Positive for unexpected weight change (adrianna gain of 25lb over 3 yr). Negative for activity change, appetite change, chills and fever.   HENT: Negative for congestion, ear discharge, ear pain and hearing loss.    Eyes: Negative for pain, discharge, redness and itching.   Respiratory: Positive for shortness of breath. Negative for apnea, cough, chest tightness and wheezing. Stridor: during episode presyncopal episode this AM.    Cardiovascular: Positive for leg swelling (intermittent  - currnet episode x 3 days). Negative  for chest pain.   Gastrointestinal: Positive for abdominal distention, nausea and vomiting. Negative for abdominal pain, blood in stool, constipation and diarrhea.   Endocrine: Negative for cold intolerance.   Genitourinary: Negative for difficulty urinating, dysuria, flank pain, frequency, hematuria and urgency.   Musculoskeletal: Negative for arthralgias, back pain, gait problem, myalgias, neck pain and neck stiffness.   Skin: Negative for color change, pallor and rash.   Allergic/Immunologic: Negative for environmental allergies.   Neurological: Positive for dizziness, weakness and light-headedness. Negative for seizures, syncope, facial asymmetry, speech difficulty and headaches.   Hematological: Negative for adenopathy.   Psychiatric/Behavioral: Positive for dysphoric mood. Negative for suicidal ideas.     Objective:     Vital Signs (Most Recent):  Pulse: (!) 52 (08/24/17 0942)  Resp: 17 (08/24/17 0713)  BP: (!) 141/65 (08/24/17 0942)  SpO2: 95 % (08/24/17 0942) Vital Signs (24h Range):  Pulse:  [52-68] 52  Resp:  [17-18] 17  SpO2:  [93 %-96 %] 95 %  BP: (110-141)/(55-69) 141/65        There is no height or weight on file to calculate BMI.    Physical Exam   Constitutional: She is oriented to person, place, and time. She appears well-developed and well-nourished.   Morbid obesity   HENT:   Head: Normocephalic and atraumatic.   Mouth/Throat: Oropharynx is clear and moist. No oropharyngeal exudate.   tongue bite? - lateral tongue   Eyes: Conjunctivae and EOM are normal. Pupils are equal, round, and reactive to light. Right eye exhibits no discharge. Left eye exhibits no discharge. No scleral icterus.   Neck: Normal range of motion. Neck supple. No JVD present. No tracheal deviation present. No thyromegaly present.   Cardiovascular: Normal rate and regular rhythm.  Exam reveals no gallop and no friction rub.    Murmur (2/6 systolic murmur) heard.  Pulmonary/Chest: Effort normal and breath sounds normal. No  respiratory distress. She has no wheezes. She has no rales.   Abdominal: Soft. Bowel sounds are normal. She exhibits distension. She exhibits no mass. There is no tenderness. There is no guarding.   Musculoskeletal: Normal range of motion. She exhibits edema (2+ bilateral LE, LLE bigger than right). She exhibits no deformity.   Lymphadenopathy:     She has no cervical adenopathy.   Neurological: She is oriented to person, place, and time.   no focal neurological deficits   Skin: Skin is warm and dry. No erythema.   psoriatic plaque on the left knee   Psychiatric: She has a normal mood and affect.   Nursing note and vitals reviewed.      Significant Labs:   A1C:   Recent Labs  Lab 06/26/17  1053   HGBA1C 4.7     ABGs: No results for input(s): PH, PCO2, HCO3, POCSATURATED, BE, TOTALHB, COHB, METHB, O2HB, POCFIO2 in the last 48 hours.  Bilirubin:   Recent Labs  Lab 08/24/17  0641   BILITOT 0.7     Blood Culture: No results for input(s): LABBLOO in the last 48 hours.  BMP:   Recent Labs  Lab 08/24/17  0641   *      K 2.9*      CO2 21*   BUN 18   CREATININE 1.0   CALCIUM 9.2   MG 1.6     CBC:   Recent Labs  Lab 08/24/17  0641   WBC 10.55   HGB 12.9   HCT 37.7   *     CMP:   Recent Labs  Lab 08/24/17  0641      K 2.9*      CO2 21*   *   BUN 18   CREATININE 1.0   CALCIUM 9.2   PROT 7.3   ALBUMIN 3.4*   BILITOT 0.7   ALKPHOS 89   AST 65*   ALT 71*   ANIONGAP 13   EGFRNONAA 59.3*     Cardiac Markers:   Recent Labs  Lab 08/24/17  0641   BNP 16     Coagulation:   Recent Labs  Lab 08/24/17  0641   INR 1.0     Lactic Acid: No results for input(s): LACTATE in the last 48 hours.  Lipase:   Recent Labs  Lab 08/24/17  0641   LIPASE 25     Lipid Panel: No results for input(s): CHOL, HDL, LDLCALC, TRIG, CHOLHDL in the last 48 hours.  Magnesium:   Recent Labs  Lab 08/24/17  0641   MG 1.6     Pathology Results  (Last 10 years)    None        POCT Glucose:   Recent Labs  Lab 08/24/17  1221    POCTGLUCOSE 111*     Prealbumin: No results for input(s): PREALBUMIN in the last 48 hours.  Respiratory Culture: No results for input(s): GSRESP, RESPIRATORYC in the last 48 hours.  Troponin:   Recent Labs  Lab 08/24/17  0641 08/24/17  1234   TROPONINI 0.015 0.070*     TSH:   Recent Labs  Lab 06/26/17  1053   TSH 2.296     Urine Culture: No results for input(s): LABURIN in the last 48 hours.  Urine Studies: No results for input(s): COLORU, APPEARANCEUA, PHUR, SPECGRAV, PROTEINUA, GLUCUA, KETONESU, BILIRUBINUA, OCCULTUA, NITRITE, UROBILINOGEN, LEUKOCYTESUR, RBCUA, WBCUA, BACTERIA, SQUAMEPITHEL, HYALINECASTS in the last 48 hours.    Invalid input(s): WRIGHTSUR  All pertinent labs within the past 24 hours have been reviewed.    Significant Imaging:  Imaging Results          X-Ray Chest PA And Lateral (Final result)  Result time 08/24/17 10:13:18    Final result by Yudi Bullock MD (08/24/17 10:13:18)                 Impression:      No convincing evidence of intrathoracic disease identified in this patient with a history of syncope.      Electronically signed by: Yudi Bullock MD  Date:     08/24/17  Time:    10:13              Narrative:    Time of Procedure: 08/24/17 09:44:00  Accession # [id number]    Comparison: 8/24/2017, 0650 hrs.    Number of views: 2.     Findings:  X-ray beam attenuation and scatter occur in generous overlying soft tissues.  Intrathoracic structures are magnified by the patient's thick body wall.  Soft tissues of the patient's arms project over the lateral image obscuring some detail of the retrosternal airspace and mediastinal margins.    Lung volumes are normal and symmetric.  Mediastinal structures are midline.    I detect no convincing evidence of pulmonary disease, pleural fluid, lymph node enlargement, cardiac decompensation, pneumothorax, pneumomediastinum, pneumoperitoneum or significant osseous abnormality.                             CT Head Without Contrast (Final result)   Result time 08/24/17 08:53:47    Final result by Lukas Zaragoza DO (08/24/17 08:53:47)                 Impression:     Unremarkable slightly motion limited noncontrast CT head specifically without evidence for acute intracranial hemorrhage or significant new abnormal parenchymal attenuation.. Further evaluation as warranted clinically.      Electronically signed by: LUKAS ZARAGOZA DO  Date:     08/24/17  Time:    08:53              Narrative:    CT brain without contrast.    Comparison: 06/26/2017    Technique: Multiple 5 mm axial images of the head were obtained without intravenous contrast.    Findings: The study is slightly limited by patient motion allowing for motion limitation there is no evidence for acute intracranial hemorrhage or sulcal effacement. No definite new abnormal parenchymal attenuation. The ventricles are normal in size and configuration without evidence for hydrocephalus.  There is no midline shift or mass effect. The visualized paranasal sinuses and mastoid air cells are clear..                             X-Ray Chest AP Portable (Final result)  Result time 08/24/17 07:13:33    Final result by Kailee Ladd MD (08/24/17 07:13:33)                 Impression:        No definite radiographic evidence of acute intrathoracic process. Further evaluation with dedicated PA and lateral views as clinically warranted.      Electronically signed by: KAILEE LADD  Date:     08/24/17  Time:    07:13              Narrative:    Comparison:None    Technique: Single AP portable chest radiograph.    Findings:     This exam is suboptimal secondary to artifact related to the patient's body habitus. Cardiac monitoring leads over the chest. The cardiomediastinal silhouette appears enlarged. The trachea is midline and the lungs appear symmetrically expanded. There is bibasilar subsegmental atelectasis or scarring. No convincing evidence of focal alveolar consolidation or large pleural effusion. There is no  evidence of pneumothorax. Visualized osseous structures appear intact.                            EKG - NSR @ 77bpm left axis deviation, anterolateral infarct?  Assessment/Plan:     Active Diagnoses:    Diagnosis Date Noted POA    PRINCIPAL PROBLEM:  Syncope [R55] vs Presyncope vs Seizure Status post cardiology and neurology evaluation. MRI brain with and without contrast, EEG. Telemetry monitoring, echocardiogram. Serial cardiac enzymes, CT head specifically without evidence for acute intracranial hemorrhage or significant new abnormal parenchymal attenuation.. 08/24/2017 Yes    Transaminitis [R74.0]65/71-monitor 08/24/2017 Yes    Acute hypokalemia [E87.6]2.9 replaced 06/26/2017 Yes    Bradycardia [R00.1]Bradycardia off 50s in the emergency department. Consider implantable loop recorder per cardiology If workup negative 06/26/2017 Yes    Morbid obesity due to excess calories [E66.01]There is no height or weight on file to calculate BMI. Morbid obesity complicates all aspects of disease management from diagnostic modalities to treatment. Weight loss encouraged and health benefits explained to patient. 06/26/2017 Yes    New onset type 2 diabetes mellitus [E11.9]Ruled out. HbA1c of 4.7 last admission 06/26/2017 Yes    Paroxysmal atrial fibrillation [I48.0]Currently in normal sinus rhythm. on apixaban. Continue metoprolol for now   Thrombocytopenia 145 - Monitor  Bilateral lower extremity edema - DVT sonogram negative. Bilateral Baker's cysts are noted measuring 1.2 x 1.0 x 2.0 cm on the right and 1.4 x 0.8 x 1.4 cm on the left. BNP of 16  Psoriatic plaque Left knee - asymptomatic. Dermatology follow up as outpatient 06/26/2017 Yes   Problems Resolved During this Admission:    Diagnosis Date Noted Date Resolved POA     VTE Risk Mitigation         Ordered     Medium Risk of VTE  Once      08/24/17 1051     Place sequential compression device  Until discontinued      08/24/17 1051            Yony Jackson,  MD  Department of Hospital Medicine   [facility]

## 2017-08-24 NOTE — ED NOTES
Pt ambulatory to bathroom; assisted by ED tech.  Steady gait noted.  Pt unable to provide urine specimen at this time; states she will try again later.  Pt back to room 25 without incidence; will continue to monitor pt.

## 2017-08-24 NOTE — ASSESSMENT & PLAN NOTE
Black out episode this morning that occurred after awaking from sleep with lateral tongue bite. Patient reports waking up with sensation like she was going to pass out. Brother heard her making snoring noises and called EMS. She remembers entire episode and says she was aware and able to hear during spell. Normally when she gets this feeling, she sits down or lies flat and symptoms improve. Hx of similar episodes over the past few years with prodrome of diaphoresis, tunnel vision, lightheadedness and dizziness. Admitted June 2017 with similar complaints and evaluated by Cardiology who felt episode was related to vasovagal syncope. This admission patient feels back to cognitive baseline and says episodes are so sporadic she has not sought outpatient care. She had 30 day event monitor after last admission. She denies hx of seizures, head trauma or CNS infections. CT Head WO contrast and routine EEG 8/24 were unremarkable for acute intracranial pathology and no evidence of epileptiform discharges on EEG. Neuro exam 8/24 is non-focal.    Concern for possible convulsive syncope vs. apneic episodes    -MRI Brain W WO contrast   -carotid U/S, echocardiogram, orthostatic vital signs, consider outpatient tilt table/EP assessment  -outpatient polysomnogram to r/o sleep apnea  -consider vascular imaging with CTA or MRA to assess for low flow if cardiac workup is unrevealing

## 2017-08-24 NOTE — CONSULTS
"[facility]  Neurology  Consult Note    Patient Name: Danita Hall  MRN: [id number]  Admission Date: 8/24/2017  Hospital Length of Stay: 0 days  Code Status: Full Code   Attending Provider: Mando Carrion, *   Consulting Provider: Diana Garibay PA-C  Primary Care Physician: Primary Doctor No  Principal Problem:Syncope    Inpatient consult to neurology  Consult performed by: DIANA GARIBAY  Consult ordered by: MANDO CARRION         Subjective:     Chief Complaint:  Seizure vs. syncope     HPI:   66 y.o. Female with no significant PMHx presented to ED 8/24/17 after black out episode this morning. Patient reports she was sleeping then woke up around 3 am feeling like she was about to pass out. She felt aware of her surroundings and was able to hear her brother talking to her. She felt like something was trying to "suck her into a hole" and she blacked out for a few seconds and bite her tongue. She reports sporadic episodes of lightheadedness, dizziness, tunnel vision, diaphoresis over the past few years. She had a similar episode in June and [place] [facility] [place] for dizziness, nausea, vomiting and confusion. She was bradycardic and given atropine then went into Afib with RVR and started on metoprolol and apixaban and sent home with 30 day event monitor with plan to follow-up with Cardiology. She was evaluated by Cardiology during June admission and they felt episode was 2/2 vasovagal syncope. She has yet to follow-up with Cards outpatient and presented today because this was her first episode that occurred while sleeping. Normally episodes occur while standing, but she also reports a few events when sitting. She is afraid to drive in case an episode occurs, but feels she has enough warning to pull over. Episodes are normally preceded with tunnel vision, diaphoresis, muffled hearing and usually last a few seconds. She does not lose consciousness. She normally " lowers herself to the floor or lays flat. Episode this morning was her first with tongue biting and she denies urinary/bowel incontinence during episodes. She walks unassisted at baseline and lives with her brother. She is concerned about a possible food allergy and reports episodes happen after consuming milk, even powered milk in her coffee and cooking oils. No hx of seizures, head trauma or CNS infections.      Past Medical History:   Diagnosis Date    DM (diabetes mellitus)     Gout      No past surgical history on file.    Review of patient's allergies indicates:  No Known Allergies    No current facility-administered medications on file prior to encounter.      Current Outpatient Prescriptions on File Prior to Encounter   Medication Sig    apixaban 5 mg Tab Take 1 tablet (5 mg total) by mouth 2 (two) times daily.    metoprolol succinate (TOPROL-XL) 50 MG 24 hr tablet Take 1 tablet (50 mg total) by mouth once daily.     Family History     None        Social History Main Topics    Smoking status: Never Smoker    Smokeless tobacco: Never Used    Alcohol use No    Drug use: No    Sexual activity: Not Currently     Review of Systems   Constitutional: Positive for activity change and diaphoresis. Negative for fatigue, fever and unexpected weight change.   HENT: Negative for tinnitus, trouble swallowing and voice change.    Eyes: Positive for visual disturbance. Negative for photophobia, pain and redness.   Respiratory: Negative for shortness of breath.    Cardiovascular: Negative for chest pain and palpitations.   Gastrointestinal: Positive for nausea. Negative for abdominal pain and vomiting.   Musculoskeletal: Negative for gait problem, neck pain and neck stiffness.   Allergic/Immunologic: Negative for immunocompromised state.   Neurological: Positive for dizziness and light-headedness. Negative for tremors, seizures, facial asymmetry, speech difficulty, weakness, numbness and headaches.     Objective:      Vital Signs (Most Recent):  Pulse: 60 (08/24/17 1639)  Resp: 18 (08/24/17 1416)  BP: (!) 144/69 (08/24/17 1639)  SpO2: (!) 79 % (08/24/17 1639) Vital Signs (24h Range):  Pulse:  [52-68] 60  Resp:  [16-18] 18  SpO2:  [79 %-99 %] 79 %  BP: (110-173)/(55-95) 144/69        There is no height or weight on file to calculate BMI.    Physical Exam   Constitutional: She is oriented to person, place, and time. She appears well-developed and well-nourished. No distress.   HENT:   Head: Normocephalic and atraumatic.   Eyes: Conjunctivae and EOM are normal. Pupils are equal, round, and reactive to light.   Neck: Normal range of motion. Neck supple.   Pulmonary/Chest: Effort normal.   Musculoskeletal: Normal range of motion.   Neurological: She is oriented to person, place, and time. She has a normal Finger-Nose-Finger Test.   Reflex Scores:       Bicep reflexes are 2+ on the right side and 2+ on the left side.       Patellar reflexes are 2+ on the right side and 2+ on the left side.  Skin: She is not diaphoretic.   Psychiatric: Her speech is normal.     NEUROLOGICAL EXAMINATION:     MENTAL STATUS   Oriented to person, place, and time.   Recall at 5 minutes: recalls 3 of 3 objects. Follows 2 step commands.   Attention: normal. Concentration: normal.   Speech: speech is normal   Level of consciousness: alert  Knowledge: good.   Able to repeat. Normal comprehension. Praxis: normal     CRANIAL NERVES     CN III, IV, VI   Pupils are equal, round, and reactive to light.  Extraocular motions are normal.   Right pupil: Shape: regular.   Left pupil: Shape: regular. Reactivity: brisk.   Nystagmus: none   Diplopia: none  Ophthalmoparesis: none    CN V   Facial sensation intact.     CN VII   Facial expression full, symmetric.     CN VIII   CN VIII normal.     CN IX, X   CN IX normal.   CN X normal.     CN XI   CN XI normal.     CN XII   CN XII normal.     MOTOR EXAM   Muscle bulk: normal  Overall muscle tone: normal  Right arm pronator  drift: absent  Left arm pronator drift: absent    Strength   Right deltoid: 5/5  Left deltoid: 5/5  Right biceps: 5/5  Left biceps: 5/5  Right triceps: 5/5  Left triceps: 5/5  Right wrist flexion: 5/5  Left wrist flexion: 5/5  Right wrist extension: 5/5  Left wrist extension: 5/5  Right iliopsoas: 5/5  Left iliopsoas: 5/5  Right quadriceps: 5/5  Left quadriceps: 5/5  Right anterior tibial: 5/5  Left anterior tibial: 5/5  Right peroneal: 5/5  Left peroneal: 5/5    REFLEXES     Reflexes   Right biceps: 2+  Left biceps: 2+  Right patellar: 2+  Left patellar: 2+  Right plantar: normal  Left plantar: normal  Right ankle clonus: absent  Left ankle clonus: absent    SENSORY EXAM   Light touch normal.   Right leg vibration: decreased from ankle  Left leg vibration: decreased from ankle    GAIT AND COORDINATION      Coordination   Finger to nose coordination: normal    Tremor   Resting tremor: absent    Significant Labs:   Hemoglobin A1c: No results for input(s): HGBA1C in the last 720 hours.  CBC:   Recent Labs  Lab 08/24/17  0641   WBC 10.55   HGB 12.9   HCT 37.7   *     CMP:   Recent Labs  Lab 08/24/17  0641   *      K 2.9*      CO2 21*   BUN 18   CREATININE 1.0   CALCIUM 9.2   MG 1.6   PROT 7.3   ALBUMIN 3.4*   BILITOT 0.7   ALKPHOS 89   AST 65*   ALT 71*   ANIONGAP 13   EGFRNONAA 59.3*     Inflammatory Markers: No results for input(s): SEDRATE, CRP, PROCAL in the last 48 hours.  Urine Culture: No results for input(s): LABURIN in the last 48 hours.  Urine Studies: No results for input(s): COLORU, APPEARANCEUA, PHUR, SPECGRAV, PROTEINUA, GLUCUA, KETONESU, BILIRUBINUA, OCCULTUA, NITRITE, UROBILINOGEN, LEUKOCYTESUR, RBCUA, WBCUA, BACTERIA, SQUAMEPITHEL, HYALINECASTS in the last 48 hours.    Invalid input(s): WRIGHTSUR  All pertinent lab results from the past 24 hours have been reviewed.    Routine EEG (8/24/17):  Prelim per Dr. Bennett-Normal without evidence of ictal discharges     Significant  Imaging: I have reviewed and interpreted all pertinent imaging results/findings within the past 24 hours.     CT Head WO contrast (8/24/17):  Unremarkable slightly motion limited noncontrast CT head specifically without evidence for acute intracranial hemorrhage or significant new abnormal parenchymal attenuation.    Assessment and Plan:     * Syncope    Black out episode this morning that occurred after awaking from sleep with lateral tongue bite. Patient reports waking up with sensation like she was going to pass out. Brother heard her making snoring noises and called EMS. She remembers entire episode and says she was aware and able to hear during spell. Normally when she gets this feeling, she sits down or lies flat and symptoms improve. Hx of similar episodes over the past few years with prodrome of diaphoresis, tunnel vision, lightheadedness and dizziness. Admitted June 2017 with similar complaints and evaluated by Cardiology who felt episode was related to vasovagal syncope. This admission patient feels back to cognitive baseline and says episodes are so sporadic she has not sought outpatient care. She had 30 day event monitor after last admission. She denies hx of seizures, head trauma or CNS infections. CT Head WO contrast and routine EEG 8/24 were unremarkable for acute intracranial pathology and no evidence of epileptiform discharges on EEG. Neuro exam 8/24 is non-focal.    Concern for possible convulsive syncope vs. apneic episodes. Less likely epileptic seizures considering history and negative EEG.     -MRI Brain W WO contrast   -carotid U/S, echocardiogram, orthostatic vital signs, consider outpatient tilt table/EP assessment  -outpatient polysomnogram to r/o sleep apnea  -consider vascular imaging with CTA or MRA to assess for low flow if cardiac workup is unrevealing     VTE Risk Mitigation         Ordered     Medium Risk of VTE  Once      08/24/17 1051     Place sequential compression device  Until  discontinued      08/24/17 1051        Dr. Gutierrez attestation to follow  Thank you for your consult. I will follow-up with patient. Please contact us if you have any additional questions.    Diana Garibay PA-C  General Neurology Consult  Neuro Consult Spectralink # [id number]

## 2017-08-24 NOTE — HPI
"66 y.o. Female with no significant PMHx presented to ED 8/24/17 after black out episode this morning. Patient reports she was sleeping then woke up around 3 am feeling like she was about to pass out. She felt aware of her surroundings and was able to hear her brother talking to her. She felt like something was trying to "suck her into a hole" and she blacked out for a few seconds and bite her tongue. She reports sporadic episodes of lightheadedness, dizziness, tunnel vision, diaphoresis over the past few years. She had a similar episode in June and was admitted at [facility] 6/26-6/27/17 for dizziness, nausea, vomiting and confusion. She was bradycardic and given atropine then went into Afib with RVR and started on metoprolol and apixaban and sent home with 30 day event monitor with plan to follow-up with Cardiology. She was evaluated by Cardiology during June admission and they felt episode was 2/2 vasovagal syncope. She has yet to follow-up with Cards outpatient and presented today because this was her first episode that occurred while sleeping. Normally episodes occur while standing, but she also reports a few events when sitting. She is afraid to drive in case an episode occurs, but feels she has enough warning to pull over. Episodes are normally preceded with tunnel vision, diaphoresis, muffled hearing and usually last a few seconds. She does not lose consciousness. She normally lowers herself to the floor or lays flat. Episode this morning was her first with tongue biting and she denies urinary/bowel incontinence during episodes. She walks unassisted at baseline and lives with her brother. She is concerned about a possible food allergy and reports episodes happen after consuming milk, even powered milk in her coffee and cooking oils. No hx of seizures, head trauma or CNS infections.   "

## 2017-08-25 VITALS
HEART RATE: 62 BPM | SYSTOLIC BLOOD PRESSURE: 140 MMHG | RESPIRATION RATE: 16 BRPM | HEIGHT: 68 IN | DIASTOLIC BLOOD PRESSURE: 78 MMHG | WEIGHT: 280 LBS | TEMPERATURE: 98 F | BODY MASS INDEX: 42.44 KG/M2 | OXYGEN SATURATION: 95 %

## 2017-08-25 PROBLEM — E11.9 NEW ONSET TYPE 2 DIABETES MELLITUS: Status: RESOLVED | Noted: 2017-06-26 | Resolved: 2017-08-25

## 2017-08-25 PROBLEM — E87.6 ACUTE HYPOKALEMIA: Status: RESOLVED | Noted: 2017-06-26 | Resolved: 2017-08-25

## 2017-08-25 PROBLEM — R55 SYNCOPE AND COLLAPSE: Status: RESOLVED | Noted: 2017-06-26 | Resolved: 2017-08-25

## 2017-08-25 PROBLEM — R55 SYNCOPE: Status: RESOLVED | Noted: 2017-08-24 | Resolved: 2017-08-25

## 2017-08-25 PROBLEM — R74.01 TRANSAMINITIS: Status: RESOLVED | Noted: 2017-08-24 | Resolved: 2017-08-25

## 2017-08-25 LAB
ALBUMIN SERPL BCP-MCNC: 3.2 G/DL
ALBUMIN SERPL BCP-MCNC: 3.2 G/DL
ALP SERPL-CCNC: 76 U/L
ALP SERPL-CCNC: 76 U/L
ALT SERPL W/O P-5'-P-CCNC: 51 U/L
ALT SERPL W/O P-5'-P-CCNC: 51 U/L
ANION GAP SERPL CALC-SCNC: 9 MMOL/L
AST SERPL-CCNC: 31 U/L
AST SERPL-CCNC: 31 U/L
BASOPHILS # BLD AUTO: 0.01 K/UL
BASOPHILS NFR BLD: 0.2 %
BILIRUB DIRECT SERPL-MCNC: 0.3 MG/DL
BILIRUB SERPL-MCNC: 0.5 MG/DL
BILIRUB SERPL-MCNC: 0.5 MG/DL
BUN SERPL-MCNC: 13 MG/DL
CALCIUM SERPL-MCNC: 9.3 MG/DL
CHLORIDE SERPL-SCNC: 106 MMOL/L
CO2 SERPL-SCNC: 27 MMOL/L
CREAT SERPL-MCNC: 0.8 MG/DL
DIFFERENTIAL METHOD: NORMAL
EOSINOPHIL # BLD AUTO: 0.1 K/UL
EOSINOPHIL NFR BLD: 0.8 %
ERYTHROCYTE [DISTWIDTH] IN BLOOD BY AUTOMATED COUNT: 12.9 %
EST. GFR  (AFRICAN AMERICAN): >60 ML/MIN/1.73 M^2
EST. GFR  (NON AFRICAN AMERICAN): >60 ML/MIN/1.73 M^2
ESTIMATED AVG GLUCOSE: 88 MG/DL
ESTIMATED PA SYSTOLIC PRESSURE: 19.36
GLOBAL PERICARDIAL EFFUSION: NORMAL
GLUCOSE SERPL-MCNC: 93 MG/DL
HBA1C MFR BLD HPLC: 4.7 %
HCT VFR BLD AUTO: 38.8 %
HGB BLD-MCNC: 13.1 G/DL
LYMPHOCYTES # BLD AUTO: 1.9 K/UL
LYMPHOCYTES NFR BLD: 30.4 %
MAGNESIUM SERPL-MCNC: 1.8 MG/DL
MCH RBC QN AUTO: 30.1 PG
MCHC RBC AUTO-ENTMCNC: 33.8 G/DL
MCV RBC AUTO: 89 FL
MITRAL VALVE REGURGITATION: NORMAL
MONOCYTES # BLD AUTO: 0.3 K/UL
MONOCYTES NFR BLD: 5 %
NEUTROPHILS # BLD AUTO: 3.9 K/UL
NEUTROPHILS NFR BLD: 63.4 %
PHOSPHATE SERPL-MCNC: 3.4 MG/DL
PLATELET # BLD AUTO: 159 K/UL
PMV BLD AUTO: 11.4 FL
POCT GLUCOSE: 113 MG/DL (ref 70–110)
POTASSIUM SERPL-SCNC: 3.4 MMOL/L
PROT SERPL-MCNC: 6.9 G/DL
PROT SERPL-MCNC: 6.9 G/DL
RBC # BLD AUTO: 4.35 M/UL
RETIRED EF AND QEF - SEE NOTES: 65 (ref 55–65)
SODIUM SERPL-SCNC: 142 MMOL/L
TRICUSPID VALVE REGURGITATION: NORMAL
TROPONIN I SERPL DL<=0.01 NG/ML-MCNC: 0.03 NG/ML
WBC # BLD AUTO: 6.16 K/UL

## 2017-08-25 PROCEDURE — 93306 TTE W/DOPPLER COMPLETE: CPT

## 2017-08-25 PROCEDURE — 84484 ASSAY OF TROPONIN QUANT: CPT

## 2017-08-25 PROCEDURE — 36415 COLL VENOUS BLD VENIPUNCTURE: CPT

## 2017-08-25 PROCEDURE — 84100 ASSAY OF PHOSPHORUS: CPT

## 2017-08-25 PROCEDURE — 83735 ASSAY OF MAGNESIUM: CPT

## 2017-08-25 PROCEDURE — 25000003 PHARM REV CODE 250: Performed by: HOSPITALIST

## 2017-08-25 PROCEDURE — 93306 TTE W/DOPPLER COMPLETE: CPT | Mod: 26,,, | Performed by: INTERNAL MEDICINE

## 2017-08-25 PROCEDURE — 83036 HEMOGLOBIN GLYCOSYLATED A1C: CPT

## 2017-08-25 PROCEDURE — 80053 COMPREHEN METABOLIC PANEL: CPT

## 2017-08-25 PROCEDURE — 85025 COMPLETE CBC W/AUTO DIFF WBC: CPT

## 2017-08-25 PROCEDURE — 99239 HOSP IP/OBS DSCHRG MGMT >30: CPT | Mod: ,,, | Performed by: HOSPITALIST

## 2017-08-25 RX ORDER — POTASSIUM CHLORIDE 20 MEQ/1
40 TABLET, EXTENDED RELEASE ORAL ONCE
Status: COMPLETED | OUTPATIENT
Start: 2017-08-25 | End: 2017-08-25

## 2017-08-25 RX ORDER — METOPROLOL SUCCINATE 50 MG/1
50 TABLET, EXTENDED RELEASE ORAL DAILY
Qty: 30 TABLET | Refills: 1 | Status: SHIPPED | OUTPATIENT
Start: 2017-08-25 | End: 2018-08-25

## 2017-08-25 RX ADMIN — METOPROLOL SUCCINATE 50 MG: 50 TABLET, EXTENDED RELEASE ORAL at 08:08

## 2017-08-25 RX ADMIN — APIXABAN 5 MG: 2.5 TABLET, FILM COATED ORAL at 12:08

## 2017-08-25 RX ADMIN — APIXABAN 5 MG: 2.5 TABLET, FILM COATED ORAL at 08:08

## 2017-08-25 RX ADMIN — POTASSIUM CHLORIDE 40 MEQ: 1500 TABLET, EXTENDED RELEASE ORAL at 08:08

## 2017-08-25 NOTE — NURSING
Arrived to unit on stretcher. Ambulated to bed unassisted, aaox4. Fall/safety precautions reviewed. Bed in lowest position, call bell in reach. Denies weakness, dizziness, pain or discomfort. See flow sheets for assessments.

## 2017-08-25 NOTE — ASSESSMENT & PLAN NOTE
-Ddx:  Vasovagal syncope, vascular insufficiency, structural heart disease, arrhythmia, orthostatic hypotension, seizure  -Story most consistent with vasovagal syncope. Other workup showing:    -EEG wnl, no epileptiform activity.  Hx not consistent with seizure.   -EKG and Echo wnl and stable from prior--follows with outpatient Cardiology.     -Orthostatic VS ~wnl though patient having BLE edema, will start compression stockings.    -Scheduled for US bilateral carotid to evaluate for stenosis. Will follow up results.  -Of note, patient seems to have signs consistent with SUAD which may explain her syncope which occurred at night   -Would recommend outpatient sleep study if patient agrees. Can reduce vasovagal syncope in some pts.  References below.    References:  Vladimir D, Donnie L, Richelle N, Sherwin MILLS.  Syncope in hypoxemic respiratory distress.  Journal of Sleep Medicine & Disorders 2(2): 1019-21.  Jareth Hong et al. Sleep related breathing disorders and vasovagal syncope, a possible causal link?  International Journal of Cardiology , Volume 168 , Issue 2 , 1666 - 1661  Wilmer FB, Yung AL, Caridad BARRETT, Shayna KHALIL, Yazan HANSEN L, Johanna HAGER.  Resolution of syncope with treatment of sleep apnea.  J Am Board Fam Med.  Sept-Oct 2008.  21(5):  466-8.

## 2017-08-25 NOTE — PROGRESS NOTES
[facility]  Neurology  Progress Note    Patient Name: Danita Hall  MRN: [id number]  Admission Date: 8/24/2017  Hospital Length of Stay: 1 days  Code Status: Full Code   Attending Provider: Yony Jackson MD  Primary Care Physician: Primary Doctor No   Principal Problem:Vasovagal syncope    Subjective:     Interval History:   Patient feels well this am, no questions/concerns.  Mentioned SUAD as possible reason for patient to have vasovagal syncope after waking suddenly in the midline of the night.  Patient does not believe she has SUAD but notes some recent morning headaches, weight gain, and had noted that brother said she was snoring recently.  She specifically denies snoring other than a few nights ago when she had pre-syncope.    Current Neurological Medications:   Apixaban 5 mg po bid    Current Facility-Administered Medications   Medication Dose Route Frequency Provider Last Rate Last Dose    acetaminophen tablet 650 mg  650 mg Oral Q6H PRN Yony Jackson MD        apixaban tablet 5 mg  5 mg Oral BID Yony Jackson MD   5 mg at 08/25/17 0855    dextrose 50% injection 12.5 g  12.5 g Intravenous PRN Yony Jackson MD        dextrose 50% injection 25 g  25 g Intravenous PRN Yony Jackson MD        glucagon (human recombinant) injection 1 mg  1 mg Intramuscular PRN Yony Jackson MD        glucose chewable tablet 16 g  16 g Oral PRN Yony Jackson MD        glucose chewable tablet 24 g  24 g Oral PRN Yony Jackson MD        insulin aspart pen 0-5 Units  0-5 Units Subcutaneous Daily Yony Jackson MD        metoprolol succinate (TOPROL-XL) 24 hr tablet 50 mg  50 mg Oral Daily Yony Jackson MD   50 mg at 08/25/17 0855    ondansetron injection 4 mg  4 mg Intravenous Q12H PRN Yony Jackson MD         Review of Systems   Constitutional: Positive for unexpected weight change. Negative for chills and fever.   Eyes: Negative for photophobia.    Cardiovascular: Positive for leg swelling.   Skin: Negative for rash and wound.   Neurological: Positive for syncope. Negative for weakness and numbness.   Hematological: Negative for adenopathy. Does not bruise/bleed easily.   Psychiatric/Behavioral: Negative for agitation and confusion.     Objective:     Vital Signs (Most Recent):  Temp: 98.1 °F (36.7 °C) (08/25/17 1146)  Pulse: 61 (08/25/17 1146)  Resp: 16 (08/25/17 1146)  BP: (!) 146/73 (08/25/17 1146)  SpO2: 97 % (08/25/17 1146) Vital Signs (24h Range):  Temp:  [97.8 °F (36.6 °C)-98.1 °F (36.7 °C)] 98.1 °F (36.7 °C)  Pulse:  [56-80] 61  Resp:  [16-18] 16  SpO2:  [79 %-100 %] 97 %  BP: (121-173)/(60-95) 146/73     Weight: 127 kg (279 lb 15.8 oz)  Body mass index is 42.57 kg/m².    Physical Exam  General:  Well-developed, obese, nad  HEENT:  NCAT, PERRLA, EOMI.  Oropharyngeal membranes non-erythematous/without exudate.  Mallampati III.  Neck:  Supple, no apparent JVD  Resp:  Symmetric expansion, no increased wob  CVS:  Trace BLE edema.  Extremities warm/well-perfused.  GI:  Abd soft, non-distended, obese  Neurologic:  Mental Status:  AAOx3.  Speech, thought content appropriate.  Cranial Nerves:  VFs intact throughout.  PERRLA, EOMI.  Facial sensation, movement intact and symmetric.  Palate raises symmetrically, tongue protrudes midline.  SCM/Trap 5/5 bilaterally.  Motor:  Normal bulk and tone.  Strength 5/5 throughout.  Sensory:  Intact to light touch at all extremities without inattention/extinction.  Reflexes:  Biceps, brachioradialis, patellar 2+.  No clonus.  Coordination:  FTN, NATE intact and symmetric.  No dysmetria/ataxia/dysdiadochokinesia.  Gait:  Deferred     Significant Labs:    Recent Labs  Lab 08/25/17  0429   WBC 6.16   RBC 4.35   HGB 13.1   HCT 38.8      MCV 89   MCH 30.1   MCHC 33.8       Recent Labs  Lab 08/25/17  0429   CALCIUM 9.3   PROT 6.9  6.9      K 3.4*   CO2 27      BUN 13   CREATININE 0.8   ALKPHOS 76  76   ALT  51*  51*   AST 31  31   BILITOT 0.5  0.5     Significant Imaging:   Imaging Results          MRI Brain W WO Contrast (Final result)  Result time 08/24/17 23:05:43    Impression:     Unremarkable contrast-enhanced MRI of the brain.             Narrative:    Exam: [id number]  08/24/17  21:32:20 [id number] ([facility]) : MRI BRAIN W WO CONTRAST  Technique:    Multiplanar and multisequence MRI of the brain was performed without and with intravenous contrast.  The patient received 10 cc of gadavist IV.  Comparison:    8/24/17    Findings:      The craniocervical junction is within normal limits.  The sella and parasellar structures are unremarkable.  The orbits and intraorbital contents are unremarkable.  The paranasal sinuses and right mastoid air cells are clear.  There is trace mastoid effusion on the left.   The calvarium is intact.  The intracranial flow voids are within normal limits.  No diffusion-weighted signal abnormality is present.  There is no focal parenchymal signal abnormality.  There are no extra-axial fluid collections.  There is no evidence of intracranial hemorrhage.  The ventricles and sulci are within normal limits.  There is no evidence of abnormal enhancement following intravenous contrast administration.                US Lower Extremity Veins Bilateral (Final result)  Result time 08/24/17 19:33:22    Impression:    No evidence of DVT in either lower extremity.  Bilateral Baker's cysts.             Narrative:    ULTRASOUND VENOUS BILATERAL LOWER EXTREMITIES  INDICATION: Rule out DVT.  COMPARISON: None.  TECHNIQUE:  Ultrasonographic images of bilateral common femoral veins, greater saphenous veins, superficial femoral veins, posterior tibial veins, anterior tibial veins, peroneal veins, and popliteal veins were obtained utilizing compression technique and doppler spectral imaging.     FINDINGS:   The bilateral common femoral, greater saphenous, superficial femoral, popliteal, anterior tibial, posterior  tibial, and peroneal veins are patent, compressible when applicable and demonstrate normal flow and waveforms.  Bilateral Baker's cysts are noted measuring 1.2 x 1.0 x 2.0 cm on the right and 1.4 x 0.8 x 1.4 cm on the left.                X-Ray Chest PA And Lateral (Final result)  Result time 08/24/17 10:13:18    Impression:    No convincing evidence of intrathoracic disease identified in this patient with a history of syncope.             Narrative:    Time of Procedure: 08/24/17 09:44:00  Accession # [id number]  Comparison: 8/24/2017, 0650 hrs.  Number of views: 2.   Findings:  X-ray beam attenuation and scatter occur in generous overlying soft tissues.  Intrathoracic structures are magnified by the patient's thick body wall.  Soft tissues of the patient's arms project over the lateral image obscuring some detail of the retrosternal airspace and mediastinal margins.  Lung volumes are normal and symmetric.  Mediastinal structures are midline.  I detect no convincing evidence of pulmonary disease, pleural fluid, lymph node enlargement, cardiac decompensation, pneumothorax, pneumomediastinum, pneumoperitoneum or significant osseous abnormality.                CT Head Without Contrast (Final result)  Result time 08/24/17 08:53:47    Impression:     Unremarkable slightly motion limited noncontrast CT head specifically without evidence for acute intracranial hemorrhage or significant new abnormal parenchymal attenuation.. Further evaluation as warranted clinically.             Narrative:    CT brain without contrast.  Comparison: 06/26/2017  Technique: Multiple 5 mm axial images of the head were obtained without intravenous contrast.  Findings: The study is slightly limited by patient motion allowing for motion limitation there is no evidence for acute intracranial hemorrhage or sulcal effacement. No definite new abnormal parenchymal attenuation. The ventricles are normal in size and configuration without evidence for  hydrocephalus.  There is no midline shift or mass effect. The visualized paranasal sinuses and mastoid air cells are clear..                X-Ray Chest AP Portable (Final result)  Result time 08/24/17 07:13:33    Impression:    No definite radiographic evidence of acute intrathoracic process. Further evaluation with dedicated PA and lateral views as clinically warranted.             Narrative:    Comparison:None  Technique: Single AP portable chest radiograph.  Findings:     This exam is suboptimal secondary to artifact related to the patient's body habitus. Cardiac monitoring leads over the chest. The cardiomediastinal silhouette appears enlarged. The trachea is midline and the lungs appear symmetrically expanded. There is bibasilar subsegmental atelectasis or scarring. No convincing evidence of focal alveolar consolidation or large pleural effusion. There is no evidence of pneumothorax. Visualized osseous structures appear intact.                 Assessment and Plan:     * Vasovagal syncope    -Ddx:  Vasovagal syncope, vascular insufficiency, structural heart disease, arrhythmia, orthostatic hypotension, seizure  -Story most consistent with vasovagal syncope. Other workup showing:    -EEG wnl, no epileptiform activity.  Hx not consistent with seizure.   -EKG and Echo wnl and stable from prior--follows with outpatient Cardiology.     -Orthostatic VS ~wnl though patient having BLE edema, will start compression stockings.    -Scheduled for US bilateral carotid to evaluate for stenosis. Will follow up results.  -Of note, patient seems to have signs consistent with SUAD which may explain her syncope which occurred at night   -Would recommend outpatient sleep study if patient agrees. Can reduce vasovagal syncope in some pts.  References below.    References:  Vladimir D, Donnie L, Richelle N, Sherwin MILLS.  Syncope in hypoxemic respiratory distress.  Journal of Sleep Medicine & Disorders 2(2): 1019-21.  Gomez Hong al.  Sleep related breathing disorders and vasovagal syncope, a possible causal link?  International Journal of Cardiology , Volume 168 , Issue 2 , 1666 - 1668  Guillen FB, Yung AL, Caridad YE, Shayna A, Yazan HANSEN L, Johanna HAGER.  Resolution of syncope with treatment of sleep apnea.  J Am Board Fam Med.  Sept-Oct 2008.  21(5):  466-8.     VTE Risk Mitigation         Ordered     apixaban tablet 5 mg  2 times daily     Route:  Oral        08/1950     Medium Risk of VTE  Once      08/24/17 1051     Place sequential compression device  Until discontinued      08/24/17 1051        Laurita Mcfadden MD  Neurology  [facility]

## 2017-08-25 NOTE — DISCHARGE SUMMARY
"[facility] Hospital Medicine  Discharge Summary      Patient Name: Danita Hall  MRN: [id number]  Admission Date: 8/24/2017  Hospital Length of Stay: 1 days  Discharge Date and Time:  08/25/2017 3:00 PM  Attending Physician: Yony Jackson MD   Discharging Provider: Yony Jackson MD  Primary Care Provider: LITO Leavitt MD    Hospital Medicine Team: [facility]OSP MED D Yony Jackson MD    HPI: Danita Hall is a 66 y.o. female with a PMH of Paroxysmal aatrial fibrillationwho presented to the ED on 8/24/2017 diagnosed with  significant PMHx presented to ED 8/24/17 after black out episode this morning. Patient was sleeping then woke up today am feeling like she was about to pass out. She felt aware of her surroundings and was able to hear her brother talking to her. She felt Funny sensation lightheadedness,, "Sinking her into a hole" and Had leaking of urine. Denies fecal incontinence. Patient's brother witnessed this episode on his way to bathroom, found Her staring with minimal response, labored breathing- making snorting noise. Patient mentions that she was awake and able to hear conversation. Unable to tell if she bit her tongue but found to have some mucosal tongue injury on left lateral tongue. The episode was associated with shortness of breath, subsequently EMS was called. Patient experienced nausea and had an episode of vomiting en route to the emergency department.      . She reports Occasional episodes of lightheadedness, dizziness, tunnel vision, diaphoresis over the past few years, Attributes mostly with food allergies. She had a similar episode in June and was admitted at [facility] 6/26-6/27/17 for dizziness, nausea, vomiting and confusion. She was bradycardic and given atropine then went into Afib with RVR and started on metoprolol and apixaban and sent home with 30 day event monitor - Which showed event monitor which showed sinus rhythm with IVCD and no arrhythmia or " bradycardia.   with plan to follow-up with Cardiology. She was evaluated by Cardiology during June admission and they felt episode was 2/2 vasovagal syncope. She Missed her appointment with cardiology two weeks back . At baseline, She walks unassisted at baseline and lives with her brother. She is concerned about a possible food allergy and reports episodes happen after consuming milk, even powered milk in her coffee and cooking oils. No hx of seizures, head trauma      Baseline, ADL/IADL independent    * No surgery found *      Indwelling Lines/Drains at time of discharge:   Lines/Drains/Airways          No matching active lines, drains, or airways        Hospital Course: evaluated by cardiology and neurology              Active Diagnoses:     Diagnosis Date Noted POA    PRINCIPAL PROBLEM:  Syncope [R55] vs Presyncope vs Seizure Status post cardiology and neurology evaluation. MRI brain with and without contrast, EEG. Telemetry monitoring, echocardiogram. Serial cardiac enzymes, CT head specifically without evidence for acute intracranial hemorrhage or significant new abnormal parenchymal attenuation..EEG wnl, no epileptiform activity.  Hx not consistent with seizure.EKG and Echo wnl and stable from prior--follows with outpatient Cardiology.     -Orthostatic VS - wnl though patient having BLE. Unremarkable contrast-enhanced MRI of the brain. edema, advised  compression stockings. recommended outpatient sleep study.  08/24/2017 Yes    Transaminitis [R74.0]65/71-improved to 31/51 08/24/2017 Yes    Acute hypokalemia [E87.6] replaced and resolved  06/26/2017 Yes    Bradycardia [R00.1]Bradycardia off 50s in the emergency department.  ECG showed QS waves in the inferior and anterolateral leads, but is unchanged from her previous ECGs in Jun-2017.Outpatient EP referral  implantable loop recorder and PET scan stress test per 06/26/2017 Yes    Morbid obesity due to excess calories [E66.01]There is no height or weight  on file to calculate BMI. Morbid obesity complicates all aspects of disease management from diagnostic modalities to treatment. Weight loss encouraged and health benefits explained to patient. 06/26/2017 Yes    New onset type 2 diabetes mellitus [E11.9]Ruled out. HbA1c of 4.7 this  admission 06/26/2017 Yes    Paroxysmal atrial fibrillation [I48.0]Currently in normal sinus rhythm. on apixaban. Continue metoprolol for now . Telemetry  with occasional sinus bradycardia 50s.  Thrombocytopenia 145 - Monitor  Bilateral lower extremity edema - DVT sonogram negative. Bilateral Baker's cysts are noted measuring 1.2 x 1.0 x 2.0 cm on the right and 1.4 x 0.8 x 1.4 cm on the left. BNP of 16  Psoriatic plaque Left knee - asymptomatic. Dermatology follow up as outpatient 06/26/2017 Yes       Patient being discharged with outpatient EP referral, sleep study referral      Consults:   Consults         Status Ordering Provider     Inpatient consult to Cardiology  Once     Provider:  (Not yet assigned)    Completed CASSANDRA PALMA     Inpatient consult to neurology  Once     Provider:  (Not yet assigned)    Completed RAINA BUENROSTRO          Significant Diagnostic Studies: Labs:   BMP:     Recent Labs  Lab 08/24/17  0641 08/24/17  1624 08/25/17  0429   * 101 93    143 142   K 2.9* 4.5 3.4*    109 106   CO2 21* 27 27   BUN 18 15 13   CREATININE 1.0 0.8 0.8   CALCIUM 9.2 9.5 9.3   MG 1.6  --  1.8   , CMP     Recent Labs  Lab 08/24/17  0641 08/24/17  1624 08/25/17  0429    143 142   K 2.9* 4.5 3.4*    109 106   CO2 21* 27 27   * 101 93   BUN 18 15 13   CREATININE 1.0 0.8 0.8   CALCIUM 9.2 9.5 9.3   PROT 7.3  --  6.9  6.9   ALBUMIN 3.4*  --  3.2*  3.2*   BILITOT 0.7  --  0.5  0.5   ALKPHOS 89  --  76  76   AST 65*  --  31  31   ALT 71*  --  51*  51*   ANIONGAP 13 7* 9   ESTGFRAFRICA >60.0 >60.0 >60.0   EGFRNONAA 59.3* >60.0 >60.0   , CBC     Recent Labs  Lab 08/24/17  5632  08/25/17  0429   WBC 10.55 6.16   HGB 12.9 13.1   HCT 37.7 38.8   * 159   , INR   Lab Results   Component Value Date    INR 1.0 08/24/2017   , Lipid Panel No results found for: CHOL, HDL, LDLCALC, TRIG, CHOLHDL, Troponin     Recent Labs  Lab 08/25/17  0006   TROPONINI 0.034*   , A1C:     Recent Labs  Lab 06/26/17  1053 08/25/17  0429   HGBA1C 4.7 4.7    and All labs within the past 24 hours have been reviewed  Microbiology: Blood Culture No results found for: LABBLOO, Sputum Culture No results found for: GSRESP, RESPIRATORYC and Urine Culture  No results found for: LABURIN  Radiology:  Imaging Results          US Carotid Bilateral (Final result)  Result time 08/25/17 18:58:11    Final result by Tatum Hensley MD (08/25/17 18:58:11)                 Impression:      *1.39% stenosis of the right internal carotid artery.  *1.39% stenosis of the left internal carotid artery.  ______________________________________     Electronically signed by resident: AMANDA BARTHOLOMEW MD  Date:     08/25/17  Time:    18:15            As the supervising and teaching physician, I personally reviewed the images and resident's interpretation and I agree with the findings.            Electronically signed by: TATUM HENSLEY MD  Date:     08/25/17  Time:    18:58              Narrative:    ULTRASOUND CAROTID BILATERAL    INDICATION: presyncope.    COMPARISON: None.    TECHNIQUE: Gray scale, power, color flow Doppler spectral imaging was obtained of the bilateral common carotid, internal carotid, external carotid and vertebral arteries.    FINDINGS:   Measurement of carotid stenosis is based on velocity parameters that correlate the residual internal carotid diameter with [place] symptomatic carotid endarterectomy trial (NASCET) - based stenosis levels.    RIGHT: The carotid arteries are tortuous. There is homogeneous plaque in the common and internal carotid arteries.  The peak systolic velocity of the internal carotid  artery is 109 cm/sec; the end diastolic velocity is 37 cm/sec. The peak systolic velocity of the common carotid artery is 67 cm/sec; the end diastolic velocity is 13 cm/sec.    The peak systolic velocity ratio is 1.63;  the end diastolic velocity ratio is 2.85.  The degree of stenosis is in the range of 1-39%.   Forward flow in right vertebral artery is evident.    LEFT:  The carotid arteries are tortuous. There is homogeneous plaque in the common and internal carotid arteries.  The peak systolic velocity in the internal carotid artery is 86 cm/sec; the end diastolic velocity is 34 cm/sec.  The peak systolic velocity of the common carotid artery is 63 cm/sec; the end diastolic velocity is 22 cm/sec.    The peak systolic velocity ratio is 1.37; the end diastolic velocity ratio is 1.55.   The degree of stenosis is in the range of 1-39%.  Forward flow in the left vertebral artery is evident.                             MRI Brain W WO Contrast (Final result)  Result time 08/24/17 23:05:43    Final result by Saulo Schwarz MD (08/24/17 23:05:43)                 Impression:       Unremarkable contrast-enhanced MRI of the brain.              Electronically signed by: SAULO SCHWARZ MD  Date:     08/24/17  Time:    23:05              Narrative:    Exam: [id number]  08/24/17  21:32:20 [id number] ([facility]) : MRI BRAIN W WO CONTRAST    Technique:    Multiplanar and multisequence MRI of the brain was performed without and with intravenous contrast.  The patient received 10 cc of gadavist IV.    Comparison:    8/24/17    Findings:      The craniocervical junction is within normal limits.  The sella and parasellar structures are unremarkable.  The orbits and intraorbital contents are unremarkable.  The paranasal sinuses and right mastoid air cells are clear.  There is trace mastoid effusion on the left.   The calvarium is intact.    The intracranial flow voids are within normal limits.  No diffusion-weighted signal abnormality is present.   There is no focal parenchymal signal abnormality.  There are no extra-axial fluid collections.  There is no evidence of intracranial hemorrhage.  The ventricles and sulci are within normal limits.    There is no evidence of abnormal enhancement following intravenous contrast administration.                             US Lower Extremity Veins Bilateral (Final result)  Result time 08/24/17 19:33:22    Final result by Reg Rasheed MD (08/24/17 19:33:22)                 Impression:      No evidence of DVT in either lower extremity.    Bilateral Baker's cysts.        ______________________________________     Electronically signed by resident: AMANDA BARTHOLOMEW MD  Date:     08/24/17  Time:    19:14            As the supervising and teaching physician, I personally reviewed the images and resident's interpretation and I agree with the findings.          Electronically signed by: Reg Rasheed  Date:     08/24/17  Time:    19:33              Narrative:    ULTRASOUND VENOUS BILATERAL LOWER EXTREMITIES    INDICATION: Rule out DVT.    COMPARISON: None.    TECHNIQUE:  Ultrasonographic images of bilateral common femoral veins, greater saphenous veins, superficial femoral veins, posterior tibial veins, anterior tibial veins, peroneal veins, and popliteal veins were obtained utilizing compression technique and doppler spectral imaging.     FINDINGS:     The bilateral common femoral, greater saphenous, superficial femoral, popliteal, anterior tibial, posterior tibial, and peroneal veins are patent, compressible when applicable and demonstrate normal flow and waveforms.  Bilateral Baker's cysts are noted measuring 1.2 x 1.0 x 2.0 cm on the right and 1.4 x 0.8 x 1.4 cm on the left.                             X-Ray Chest PA And Lateral (Final result)  Result time 08/24/17 10:13:18    Final result by Yudi Bullock MD (08/24/17 10:13:18)                 Impression:      No convincing evidence of intrathoracic disease  identified in this patient with a history of syncope.      Electronically signed by: Yudi Bullock MD  Date:     08/24/17  Time:    10:13              Narrative:    Time of Procedure: 08/24/17 09:44:00  Accession # [id number]    Comparison: 8/24/2017, 0650 hrs.    Number of views: 2.     Findings:  X-ray beam attenuation and scatter occur in generous overlying soft tissues.  Intrathoracic structures are magnified by the patient's thick body wall.  Soft tissues of the patient's arms project over the lateral image obscuring some detail of the retrosternal airspace and mediastinal margins.    Lung volumes are normal and symmetric.  Mediastinal structures are midline.    I detect no convincing evidence of pulmonary disease, pleural fluid, lymph node enlargement, cardiac decompensation, pneumothorax, pneumomediastinum, pneumoperitoneum or significant osseous abnormality.                             CT Head Without Contrast (Final result)  Result time 08/24/17 08:53:47    Final result by Lukas Zaragoza DO (08/24/17 08:53:47)                 Impression:     Unremarkable slightly motion limited noncontrast CT head specifically without evidence for acute intracranial hemorrhage or significant new abnormal parenchymal attenuation.. Further evaluation as warranted clinically.      Electronically signed by: LUKAS ZARAGOZA DO  Date:     08/24/17  Time:    08:53              Narrative:    CT brain without contrast.    Comparison: 06/26/2017    Technique: Multiple 5 mm axial images of the head were obtained without intravenous contrast.    Findings: The study is slightly limited by patient motion allowing for motion limitation there is no evidence for acute intracranial hemorrhage or sulcal effacement. No definite new abnormal parenchymal attenuation. The ventricles are normal in size and configuration without evidence for hydrocephalus.  There is no midline shift or mass effect. The visualized paranasal sinuses and mastoid air  cells are clear..                             X-Ray Chest AP Portable (Final result)  Result time 08/24/17 07:13:33    Final result by Kailee Ladd MD (08/24/17 07:13:33)                 Impression:        No definite radiographic evidence of acute intrathoracic process. Further evaluation with dedicated PA and lateral views as clinically warranted.      Electronically signed by: KAILEE LADD  Date:     08/24/17  Time:    07:13              Narrative:    Comparison:None    Technique: Single AP portable chest radiograph.    Findings:     This exam is suboptimal secondary to artifact related to the patient's body habitus. Cardiac monitoring leads over the chest. The cardiomediastinal silhouette appears enlarged. The trachea is midline and the lungs appear symmetrically expanded. There is bibasilar subsegmental atelectasis or scarring. No convincing evidence of focal alveolar consolidation or large pleural effusion. There is no evidence of pneumothorax. Visualized osseous structures appear intact.                            Cardiac Graphics: EKG - NSR @ 77bpm left axis deviation, anterolateral infarct?ECG showed QS waves in the inferior and anterolateral leads, but is unchanged from her previous ECGs in Jun-2017    Pending Diagnostic Studies:     None        Final Active Diagnoses:    Diagnosis Date Noted POA    PRINCIPAL PROBLEM:  Vasovagal syncope [R55] 08/24/2017 Yes    Bradycardia [R00.1] 06/26/2017 Yes    Morbid obesity due to excess calories [E66.01] 06/26/2017 Yes    Paroxysmal atrial fibrillation [I48.0] 06/26/2017 Yes      Problems Resolved During this Admission:    Diagnosis Date Noted Date Resolved POA    Transaminitis [R74.0] 08/24/2017 08/25/2017 Yes    Acute hypokalemia [E87.6] 06/26/2017 08/25/2017 Yes    New onset type 2 diabetes mellitus [E11.9] 06/26/2017 08/25/2017 Yes    Syncope and collapse [R55] 06/26/2017 08/25/2017 Yes      Discharged Condition: fair    Disposition:  Home    Follow Up:  Follow-up Information     D Ryne Leavitt MD On 9/5/2017.    Specialty:  Internal Medicine  Why:  at 1:00pm  Contact information:  [address]  [facility] [id number]  [phone]                 Patient Instructions:     Ambulatory Referral to Electrophysiology   Referral Priority: Routine Referral Type: Consultation   Referral Reason: Specialty Services Required    Requested Specialty: Electrophysiology    Number of Visits Requested: 1      Ambulatory consult to Sleep Disorders   Referral Priority: Routine Referral Type: Consultation   Number of Visits Requested: 1      Ambulatory Referral to Dermatology   Referral Priority: Routine Referral Type: Consultation   Referral Reason: Specialty Services Required    Requested Specialty: Dermatology    Number of Visits Requested: 1      Diet general     Activity as tolerated     Call MD for:  temperature >100.4     Call MD for:  redness, tenderness, or signs of infection (pain, swelling, redness, odor or green/yellow discharge around incision site)     Call MD for:  severe persistent headache     4  Medications:  Reconciled Home Medications:   Current Discharge Medication List      CONTINUE these medications which have CHANGED    Details   apixaban 5 mg Tab Take 1 tablet (5 mg total) by mouth 2 (two) times daily.  Qty: 60 tablet, Refills: 1      metoprolol succinate (TOPROL-XL) 50 MG 24 hr tablet Take 1 tablet (50 mg total) by mouth once daily.  Qty: 30 tablet, Refills: 1                    Yony Jackson MD  Department of Hospital Medicine  [facility]

## 2017-08-25 NOTE — PLAN OF CARE
Problem: Patient Care Overview  Goal: Plan of Care Review  Outcome: Ongoing (interventions implemented as appropriate)  Uneventful night. Safety/fall precautions maintained. Vital signs WDL. Slept throughout the tight. Telemetry monitor in place.

## 2017-08-25 NOTE — SUBJECTIVE & OBJECTIVE
Subjective:     Interval History:   Patient feels well this am, no questions/concerns.  Mentioned SUAD as possible reason for patient to have vasovagal syncope after waking suddenly in the midline of the night.  Patient does not believe she has SUAD but notes some recent morning headaches, weight gain, and had noted that brother said she was snoring recently.  She specifically denies snoring other than a few nights ago when she had pre-syncope.    Current Neurological Medications:   Apixaban 5 mg po bid    Current Facility-Administered Medications   Medication Dose Route Frequency Provider Last Rate Last Dose    acetaminophen tablet 650 mg  650 mg Oral Q6H PRN Yony Jackson MD        apixaban tablet 5 mg  5 mg Oral BID Yony Jackson MD   5 mg at 08/25/17 0855    dextrose 50% injection 12.5 g  12.5 g Intravenous PRN Yony Jackson MD        dextrose 50% injection 25 g  25 g Intravenous PRN Yony Jackson MD        glucagon (human recombinant) injection 1 mg  1 mg Intramuscular PRN Yony Jackson MD        glucose chewable tablet 16 g  16 g Oral PRN Yony Jackson MD        glucose chewable tablet 24 g  24 g Oral PRN Yony Jackson MD        insulin aspart pen 0-5 Units  0-5 Units Subcutaneous Daily Yony Jackson MD        metoprolol succinate (TOPROL-XL) 24 hr tablet 50 mg  50 mg Oral Daily Yony Jackson MD   50 mg at 08/25/17 0855    ondansetron injection 4 mg  4 mg Intravenous Q12H PRN Yony Jackson MD         Review of Systems   Constitutional: Positive for unexpected weight change. Negative for chills and fever.   Eyes: Negative for photophobia.   Cardiovascular: Positive for leg swelling.   Skin: Negative for rash and wound.   Neurological: Positive for syncope. Negative for weakness and numbness.   Hematological: Negative for adenopathy. Does not bruise/bleed easily.   Psychiatric/Behavioral: Negative for agitation and confusion.     Objective:     Vital  Signs (Most Recent):  Temp: 98.1 °F (36.7 °C) (08/25/17 1146)  Pulse: 61 (08/25/17 1146)  Resp: 16 (08/25/17 1146)  BP: (!) 146/73 (08/25/17 1146)  SpO2: 97 % (08/25/17 1146) Vital Signs (24h Range):  Temp:  [97.8 °F (36.6 °C)-98.1 °F (36.7 °C)] 98.1 °F (36.7 °C)  Pulse:  [56-80] 61  Resp:  [16-18] 16  SpO2:  [79 %-100 %] 97 %  BP: (121-173)/(60-95) 146/73     Weight: 127 kg (279 lb 15.8 oz)  Body mass index is 42.57 kg/m².    Physical Exam  General:  Well-developed, obese, nad  HEENT:  NCAT, PERRLA, EOMI.  Oropharyngeal membranes non-erythematous/without exudate.  Mallampati III.  Neck:  Supple, no apparent JVD  Resp:  Symmetric expansion, no increased wob  CVS:  Trace BLE edema.  Extremities warm/well-perfused.  GI:  Abd soft, non-distended, obese  Neurologic:  Mental Status:  AAOx3.  Speech, thought content appropriate.  Cranial Nerves:  VFs intact throughout.  PERRLA, EOMI.  Facial sensation, movement intact and symmetric.  Palate raises symmetrically, tongue protrudes midline.  SCM/Trap 5/5 bilaterally.  Motor:  Normal bulk and tone.  Strength 5/5 throughout.  Sensory:  Intact to light touch at all extremities without inattention/extinction.  Reflexes:  Biceps, brachioradialis, patellar 2+.  No clonus.  Coordination:  FTN, NATE intact and symmetric.  No dysmetria/ataxia/dysdiadochokinesia.  Gait:  Deferred     Significant Labs:    Recent Labs  Lab 08/25/17  0429   WBC 6.16   RBC 4.35   HGB 13.1   HCT 38.8      MCV 89   MCH 30.1   MCHC 33.8       Recent Labs  Lab 08/25/17  0429   CALCIUM 9.3   PROT 6.9  6.9      K 3.4*   CO2 27      BUN 13   CREATININE 0.8   ALKPHOS 76  76   ALT 51*  51*   AST 31  31   BILITOT 0.5  0.5     Significant Imaging:   Imaging Results          MRI Brain W WO Contrast (Final result)  Result time 08/24/17 23:05:43    Impression:     Unremarkable contrast-enhanced MRI of the brain.             Narrative:    Exam: [id number]  08/24/17  21:32:20 [id number] ([facility]) : MRI BRAIN  W WO CONTRAST  Technique:    Multiplanar and multisequence MRI of the brain was performed without and with intravenous contrast.  The patient received 10 cc of gadavist IV.  Comparison:    8/24/17    Findings:      The craniocervical junction is within normal limits.  The sella and parasellar structures are unremarkable.  The orbits and intraorbital contents are unremarkable.  The paranasal sinuses and right mastoid air cells are clear.  There is trace mastoid effusion on the left.   The calvarium is intact.  The intracranial flow voids are within normal limits.  No diffusion-weighted signal abnormality is present.  There is no focal parenchymal signal abnormality.  There are no extra-axial fluid collections.  There is no evidence of intracranial hemorrhage.  The ventricles and sulci are within normal limits.  There is no evidence of abnormal enhancement following intravenous contrast administration.                US Lower Extremity Veins Bilateral (Final result)  Result time 08/24/17 19:33:22    Impression:    No evidence of DVT in either lower extremity.  Bilateral Baker's cysts.             Narrative:    ULTRASOUND VENOUS BILATERAL LOWER EXTREMITIES  INDICATION: Rule out DVT.  COMPARISON: None.  TECHNIQUE:  Ultrasonographic images of bilateral common femoral veins, greater saphenous veins, superficial femoral veins, posterior tibial veins, anterior tibial veins, peroneal veins, and popliteal veins were obtained utilizing compression technique and doppler spectral imaging.     FINDINGS:   The bilateral common femoral, greater saphenous, superficial femoral, popliteal, anterior tibial, posterior tibial, and peroneal veins are patent, compressible when applicable and demonstrate normal flow and waveforms.  Bilateral Baker's cysts are noted measuring 1.2 x 1.0 x 2.0 cm on the right and 1.4 x 0.8 x 1.4 cm on the left.                X-Ray Chest PA And Lateral (Final result)  Result time 08/24/17 10:13:18     Impression:    No convincing evidence of intrathoracic disease identified in this patient with a history of syncope.             Narrative:    Time of Procedure: 08/24/17 09:44:00  Accession # [id number]  Comparison: 8/24/2017, 0650 hrs.  Number of views: 2.   Findings:  X-ray beam attenuation and scatter occur in generous overlying soft tissues.  Intrathoracic structures are magnified by the patient's thick body wall.  Soft tissues of the patient's arms project over the lateral image obscuring some detail of the retrosternal airspace and mediastinal margins.  Lung volumes are normal and symmetric.  Mediastinal structures are midline.  I detect no convincing evidence of pulmonary disease, pleural fluid, lymph node enlargement, cardiac decompensation, pneumothorax, pneumomediastinum, pneumoperitoneum or significant osseous abnormality.                CT Head Without Contrast (Final result)  Result time 08/24/17 08:53:47    Impression:     Unremarkable slightly motion limited noncontrast CT head specifically without evidence for acute intracranial hemorrhage or significant new abnormal parenchymal attenuation.. Further evaluation as warranted clinically.             Narrative:    CT brain without contrast.  Comparison: 06/26/2017  Technique: Multiple 5 mm axial images of the head were obtained without intravenous contrast.  Findings: The study is slightly limited by patient motion allowing for motion limitation there is no evidence for acute intracranial hemorrhage or sulcal effacement. No definite new abnormal parenchymal attenuation. The ventricles are normal in size and configuration without evidence for hydrocephalus.  There is no midline shift or mass effect. The visualized paranasal sinuses and mastoid air cells are clear..                X-Ray Chest AP Portable (Final result)  Result time 08/24/17 07:13:33    Impression:    No definite radiographic evidence of acute intrathoracic process. Further evaluation with  dedicated PA and lateral views as clinically warranted.             Narrative:    Comparison:None  Technique: Single AP portable chest radiograph.  Findings:     This exam is suboptimal secondary to artifact related to the patient's body habitus. Cardiac monitoring leads over the chest. The cardiomediastinal silhouette appears enlarged. The trachea is midline and the lungs appear symmetrically expanded. There is bibasilar subsegmental atelectasis or scarring. No convincing evidence of focal alveolar consolidation or large pleural effusion. There is no evidence of pneumothorax. Visualized osseous structures appear intact.

## 2017-08-25 NOTE — PLAN OF CARE
08/25/17 1350   Discharge Assessment   Assessment Type Discharge Planning Assessment   Confirmed/corrected address and phone number on facesheet? Yes   Assessment information obtained from? Patient   Expected Length of Stay (days) 2   Communicated expected length of stay with patient/caregiver yes   Prior to hospitilization cognitive status: Alert/Oriented   Prior to hospitalization functional status: Independent   Current cognitive status: Alert/Oriented   Current Functional Status: Independent   Lives With sibling(s)  (brother, Harley Myers ([phone]))   Able to Return to Prior Arrangements yes   Is patient able to care for self after discharge? Yes   Patient's perception of discharge disposition home or selfcare   Readmission Within The Last 30 Days no previous admission in last 30 days   Patient currently being followed by outpatient case management? No   Patient currently receives any other outside agency services? No   Equipment Currently Used at Home none   Do you have any problems affording any of your prescribed medications? No   Is the patient taking medications as prescribed? yes   Does the patient have transportation home? No   Transportation Available family or friend will provide   Does the patient receive services at the [facility]? No   Discharge Plan A Home with family   Discharge Plan B Home Health   Patient/Family In Agreement With Plan yes     Patient awake & alert in bed when CM rounded. No family at the bedside. Patient was admitted with syncope. Orders noted for cards & neuro consults. Patient lives with her brother, Harley Myers ([phone]), & is independent of all ADLs. Plan to discharge patient home with support or home with home health when medically stable. Patient stated that Harley will provide transportation at time of discharge. Patient stated that she does not have a PCP & requested assistance getting established. Hospital follow up appointment & appointment to establish care  made for the patient with Dr. Ryne Leavitt on 9/5/17 at 1300. Will continue to follow.

## 2017-08-26 NOTE — PROGRESS NOTES
AVS d/c instructions given to patient, voices understanding. Telemetry d/felecia, PIV d/felecia with cath intact, gauze dressing applied. Waiting on family for . Patient will let nurse know ready to go.

## 2017-08-29 ENCOUNTER — PATIENT OUTREACH (OUTPATIENT)
Dept: ADMINISTRATIVE | Facility: CLINIC | Age: 67
End: 2017-08-29

## 2017-08-29 NOTE — PROGRESS NOTES
C3 nurse attempted to contact patient. No answer.  C3 nurse attempted to contact Danita Arceotopher  for a TCC post hospital discharge follow up call. No answer at phone number listed and no voicemail available. The patient has a [facility] appointment with Ryne Leavitt on 9\5\17 @ 1300. Message sent to Physician staff.

## 2017-09-12 ENCOUNTER — OFFICE VISIT (OUTPATIENT)
Dept: INTERNAL MEDICINE | Facility: CLINIC | Age: 67
End: 2017-09-12
Payer: MEDICARE

## 2017-09-12 VITALS
BODY MASS INDEX: 42.98 KG/M2 | HEART RATE: 66 BPM | OXYGEN SATURATION: 97 % | SYSTOLIC BLOOD PRESSURE: 130 MMHG | HEIGHT: 67 IN | DIASTOLIC BLOOD PRESSURE: 84 MMHG | WEIGHT: 273.81 LBS

## 2017-09-12 DIAGNOSIS — I48.0 PAROXYSMAL ATRIAL FIBRILLATION: Primary | ICD-10-CM

## 2017-09-12 DIAGNOSIS — E66.01 MORBID OBESITY DUE TO EXCESS CALORIES: ICD-10-CM

## 2017-09-12 DIAGNOSIS — Z13.6 SCREENING FOR HEART DISEASE: ICD-10-CM

## 2017-09-12 DIAGNOSIS — E16.2 HYPOGLYCEMIA: ICD-10-CM

## 2017-09-12 DIAGNOSIS — E87.6 HYPOKALEMIA: ICD-10-CM

## 2017-09-12 DIAGNOSIS — R55 VASOVAGAL SYNCOPE: ICD-10-CM

## 2017-09-12 PROCEDURE — 99495 TRANSJ CARE MGMT MOD F2F 14D: CPT | Mod: PBBFAC | Performed by: NURSE PRACTITIONER

## 2017-09-12 PROCEDURE — 99214 OFFICE O/P EST MOD 30 MIN: CPT | Mod: S$GLB,,, | Performed by: NURSE PRACTITIONER

## 2017-09-12 PROCEDURE — 99213 OFFICE O/P EST LOW 20 MIN: CPT | Mod: PBBFAC | Performed by: NURSE PRACTITIONER

## 2017-09-12 PROCEDURE — 99999 PR PBB SHADOW E&M-EST. PATIENT-LVL III: CPT | Mod: PBBFAC,,, | Performed by: NURSE PRACTITIONER

## 2017-09-12 NOTE — PROGRESS NOTES
INTERNAL MEDICINE PROGRESS NOTE    CHIEF COMPLAINT     Chief Complaint   Patient presents with    Hospital Follow Up       HPI     Danita Hall is a 66 y.o. female who presents for an urgent/follow up visit today.     67 y/o female with Paroxysmal Afib presents to the ED 8/24/2017 with pre-syncope and lightheadedness. +urinary incontinence. Brother witnessed episode of staring and unresponsive. +mucousal tongue injury. Similar episode in June, was admitted 6/26-6/27/2017. Bradycardia given atropine, went into afib with RVR, started on metoprolol and apixaban. Followed by Cardiology.     Hospital course: evaluated by Cardiology and neurology.  -MRI brain with and without contrast, EEG. Telemetry monitoring, echocardiogram. Serial cardiac enzymes, CT head specifically without evidence for acute intracranial hemorrhage or significant new abnormal parenchymal attenuation..EEG wnl, no epileptiform activity.  Hx not consistent with seizure.EKG and Echo wnl and stable from prior--follows with outpatient Cardiology.     -Orthostatic VS - wnl though patient having BLE. Unremarkable contrast-enhanced MRI of the brain. edema, advised  compression stockings.   -recommended outpatient sleep study.     Bilateral knee pain- worse with movement. Pain is chronic. No treatment.     Lower ext swelling- chronic, with skin color changes.     Transitional Care Note    Family and/or Caretaker present at visit?  No.  Diagnostic tests reviewed/disposition: I have reviewed all completed as well as pending diagnostic tests at the time of discharge.  Disease/illness education: yes  Home health/community services discussion/referrals: Patient does not have home health established from hospital visit.  They do not need home health.  If needed, we will set up home health for the patient.   Establishment or re-establishment of referral orders for community resources: No other necessary community resources.   Discussion with other health care  providers: No discussion with other health care providers necessary.             Past Medical History:  Past Medical History:   Diagnosis Date    DM (diabetes mellitus)     Gout        Home Medications:  Prior to Admission medications    Medication Sig Start Date End Date Taking? Authorizing Provider   apixaban 5 mg Tab Take 1 tablet (5 mg total) by mouth 2 (two) times daily. 8/25/17   Yony Jackson MD   metoprolol succinate (TOPROL-XL) 50 MG 24 hr tablet Take 1 tablet (50 mg total) by mouth once daily. 8/25/17 8/25/18  Yony Jackson MD       Review of Systems:  Review of Systems   Constitutional: Negative for chills, fatigue, fever and unexpected weight change.   HENT: Negative for congestion, hearing loss, rhinorrhea and sinus pressure.    Eyes: Negative for pain, redness and visual disturbance.   Respiratory: Negative for cough and shortness of breath.    Cardiovascular: Negative for chest pain and palpitations.   Gastrointestinal: Negative for abdominal distention, abdominal pain, constipation, diarrhea, nausea and vomiting.   Endocrine: Negative for polydipsia, polyphagia and polyuria.   Genitourinary: Negative for dysuria, frequency, urgency and vaginal discharge.   Musculoskeletal: Positive for arthralgias. Negative for gait problem and myalgias.   Skin: Negative for color change and rash.   Allergic/Immunologic: Negative for environmental allergies and immunocompromised state.   Neurological: Positive for dizziness and light-headedness (occurs after meals ). Negative for weakness and headaches.   Hematological: Negative for adenopathy. Does not bruise/bleed easily.   Psychiatric/Behavioral: Negative for confusion and sleep disturbance. The patient is not nervous/anxious.        Health Maintainence:   Immunizations:  Health Maintenance       Date Due Completion Date    Hepatitis C Screening 1950 ---    Lipid Panel 1950 ---    Foot Exam 09/21/1961 ---    Eye Exam 09/21/1961 ---    Urine  "Microalbumin 09/21/1961 ---    TETANUS VACCINE 09/21/1969 ---    Mammogram 09/21/1991 ---    DEXA SCAN 09/21/1991 ---    Colonoscopy 09/21/2001 ---    Zoster Vaccine 09/21/2011 ---    Pneumococcal (65+) (1 of 2 - PCV13) 09/21/2016 ---    Influenza Vaccine 08/01/2017 ---    Hemoglobin A1c 02/25/2018 8/25/2017           PHYSICAL EXAM     /84 (BP Location: Right arm, Patient Position: Sitting, BP Method: Large (Manual))   Pulse 66   Ht 5' 7" (1.702 m)   Wt 124.2 kg (273 lb 13 oz)   SpO2 97%   BMI 42.88 kg/m²     Physical Exam   Constitutional: She is oriented to person, place, and time. She appears well-developed and well-nourished.   HENT:   Head: Normocephalic.   Right Ear: External ear normal.   Left Ear: External ear normal.   Nose: Nose normal.   Mouth/Throat: Oropharynx is clear and moist. No oropharyngeal exudate.   Eyes: Pupils are equal, round, and reactive to light.   Neck: Neck supple. No JVD present. No tracheal deviation present. No thyromegaly present.   Cardiovascular: Normal rate, regular rhythm, normal heart sounds and intact distal pulses.  Exam reveals no gallop and no friction rub.    No murmur heard.  Pulmonary/Chest: Effort normal and breath sounds normal. No respiratory distress. She has no wheezes. She has no rales.   Abdominal: Soft. Bowel sounds are normal. She exhibits no distension. There is no tenderness.   Musculoskeletal: Normal range of motion. She exhibits edema (2+ lower ext ). She exhibits no tenderness.   Lymphadenopathy:     She has no cervical adenopathy.   Neurological: She is alert and oriented to person, place, and time.   Skin: Skin is warm and dry. No rash noted.   Psychiatric: She has a normal mood and affect. Her behavior is normal.   Vitals reviewed.      LABS     Lab Results   Component Value Date    HGBA1C 4.7 08/25/2017     CMP  Sodium   Date Value Ref Range Status   08/25/2017 142 136 - 145 mmol/L Final     Potassium   Date Value Ref Range Status   08/25/2017 " 3.4 (L) 3.5 - 5.1 mmol/L Final     Chloride   Date Value Ref Range Status   08/25/2017 106 95 - 110 mmol/L Final     CO2   Date Value Ref Range Status   08/25/2017 27 23 - 29 mmol/L Final     Glucose   Date Value Ref Range Status   08/25/2017 93 70 - 110 mg/dL Final     BUN, Bld   Date Value Ref Range Status   08/25/2017 13 8 - 23 mg/dL Final     Creatinine   Date Value Ref Range Status   08/25/2017 0.8 0.5 - 1.4 mg/dL Final     Calcium   Date Value Ref Range Status   08/25/2017 9.3 8.7 - 10.5 mg/dL Final     Total Protein   Date Value Ref Range Status   08/25/2017 6.9 6.0 - 8.4 g/dL Final   08/25/2017 6.9 6.0 - 8.4 g/dL Final     Albumin   Date Value Ref Range Status   08/25/2017 3.2 (L) 3.5 - 5.2 g/dL Final   08/25/2017 3.2 (L) 3.5 - 5.2 g/dL Final     Total Bilirubin   Date Value Ref Range Status   08/25/2017 0.5 0.1 - 1.0 mg/dL Final     Comment:     For infants and newborns, interpretation of results should be based  on gestational age, weight and in agreement with clinical  observations.  Premature Infant recommended reference ranges:  Up to 24 hours.............<8.0 mg/dL  Up to 48 hours............<12.0 mg/dL  3-5 days..................<15.0 mg/dL  6-29 days.................<15.0 mg/dL     08/25/2017 0.5 0.1 - 1.0 mg/dL Final     Comment:     For infants and newborns, interpretation of results should be based  on gestational age, weight and in agreement with clinical  observations.  Premature Infant recommended reference ranges:  Up to 24 hours.............<8.0 mg/dL  Up to 48 hours............<12.0 mg/dL  3-5 days..................<15.0 mg/dL  6-29 days.................<15.0 mg/dL       Alkaline Phosphatase   Date Value Ref Range Status   08/25/2017 76 55 - 135 U/L Final   08/25/2017 76 55 - 135 U/L Final     AST   Date Value Ref Range Status   08/25/2017 31 10 - 40 U/L Final   08/25/2017 31 10 - 40 U/L Final     ALT   Date Value Ref Range Status   08/25/2017 51 (H) 10 - 44 U/L Final   08/25/2017 51 (H) 10  - 44 U/L Final     Anion Gap   Date Value Ref Range Status   08/25/2017 9 8 - 16 mmol/L Final     eGFR if    Date Value Ref Range Status   08/25/2017 >60.0 >60 mL/min/1.73 m^2 Final     eGFR if non    Date Value Ref Range Status   08/25/2017 >60.0 >60 mL/min/1.73 m^2 Final     Comment:     Calculation used to obtain the estimated glomerular filtration  rate (eGFR) is the CKD-EPI equation. Since race is unknown   in our information system, the eGFR values for   -American and Non--American patients are given   for each creatinine result.       Lab Results   Component Value Date    WBC 6.16 08/25/2017    HGB 13.1 08/25/2017    HCT 38.8 08/25/2017    MCV 89 08/25/2017     08/25/2017     No results found for: CHOL  No results found for: HDL  No results found for: LDLCALC  No results found for: TRIG  No results found for: CHOLHDL  Lab Results   Component Value Date    TSH 2.296 06/26/2017       ASSESSMENT/PLAN     Danita Hall is a 66 y.o. female with  Past Medical History:   Diagnosis Date    DM (diabetes mellitus)     Gout        Paroxysmal atrial fibrillation- regular rate and rhythm in office. Cont metoprolol and apixaban as directed.     Vasovagal syncope- f/u with cardiology and for EP study     Screening for heart disease  -     Lipid panel; Future; Expected date: 09/12/2017    Hypoglycemia- lightheadedness ?r/t elevated insulin levels ?insulioma?. Will likely need endo referral   -     INSULIN, RANDOM; Future; Expected date: 09/12/2017    Hypokalemia- recheck K+ levels   -     Basic metabolic panel; Future; Expected date: 09/12/2017    Morbid obesity due to excess calories- discussed diet and execise.         Follow up with PCP as advised     Patient education provided from [facility]. Patient was counseled on when and how to seek emergent care.       Lizet THURMAN, APRN, FNP-c   [facility] [address]  10:45 AM

## 2017-09-14 ENCOUNTER — TELEPHONE (OUTPATIENT)
Dept: INTERNAL MEDICINE | Facility: CLINIC | Age: 67
End: 2017-09-14

## 2017-09-14 ENCOUNTER — INITIAL CONSULT (OUTPATIENT)
Dept: ELECTROPHYSIOLOGY | Facility: CLINIC | Age: 67
End: 2017-09-14
Payer: COMMERCIAL

## 2017-09-14 ENCOUNTER — HOSPITAL ENCOUNTER (OUTPATIENT)
Dept: CARDIOLOGY | Facility: CLINIC | Age: 67
Discharge: HOME | End: 2017-09-14
Payer: COMMERCIAL

## 2017-09-14 VITALS
SYSTOLIC BLOOD PRESSURE: 146 MMHG | HEIGHT: 69 IN | BODY MASS INDEX: 40.43 KG/M2 | DIASTOLIC BLOOD PRESSURE: 88 MMHG | HEART RATE: 56 BPM | WEIGHT: 273 LBS

## 2017-09-14 DIAGNOSIS — E87.6 HYPOKALEMIA: ICD-10-CM

## 2017-09-14 DIAGNOSIS — E66.01 MORBID OBESITY DUE TO EXCESS CALORIES: ICD-10-CM

## 2017-09-14 DIAGNOSIS — R07.89 ATYPICAL CHEST PAIN: ICD-10-CM

## 2017-09-14 DIAGNOSIS — I48.0 PAROXYSMAL ATRIAL FIBRILLATION: ICD-10-CM

## 2017-09-14 DIAGNOSIS — R55 VASOVAGAL SYNCOPE: Primary | ICD-10-CM

## 2017-09-14 DIAGNOSIS — R00.1 BRADYCARDIA: ICD-10-CM

## 2017-09-14 PROCEDURE — 99213 OFFICE O/P EST LOW 20 MIN: CPT | Mod: PBBFAC,25 | Performed by: INTERNAL MEDICINE

## 2017-09-14 PROCEDURE — 93005 ELECTROCARDIOGRAM TRACING: CPT | Mod: PBBFAC | Performed by: INTERNAL MEDICINE

## 2017-09-14 PROCEDURE — 93000 ELECTROCARDIOGRAM COMPLETE: CPT | Mod: S$GLB,,, | Performed by: INTERNAL MEDICINE

## 2017-09-14 PROCEDURE — 99999 PR PBB SHADOW E&M-EST. PATIENT-LVL III: CPT | Mod: PBBFAC,,, | Performed by: INTERNAL MEDICINE

## 2017-09-14 PROCEDURE — 99205 OFFICE O/P NEW HI 60 MIN: CPT | Mod: S$GLB,,, | Performed by: INTERNAL MEDICINE

## 2017-09-14 NOTE — PATIENT INSTRUCTIONS
Understanding Atrial Fibrillation    An arrhythmia is any problem with the speed or pattern of the heartbeat. Atrial fibrillation (AFib) is a common type of arrhythmia. It causes fast, chaotic electrical signals in the atria. This leads to poor functioning of the heart. It also affects how much blood your heart can pump out to the body.  Afib may occur once in a while and go away on its own. Or it may continue for longer periods and need treatment.  AFib can lead to serious problems, such as stroke. Your healthcare provider will need to monitor and manage it.  What happens during atrial fibrillation?   The heart has an electrical system that sends signals to control the heartbeat. As signals move through the heart, they tell the hearts upper chambers (atria) and lower chambers (ventricles) when to squeeze (contract) and relax. This lets blood move through the heart and out to the body and lungs.  With AFib, the atria receive abnormal signals. This causes them to contract in a fast and irregular way, and out of sync with the ventricles. When this happens, the atria also have a harder time moving blood into the ventricles. Blood may then pool in the atria, which increases the risk for blood clots and stroke. The ventricles also may contract too quickly and irregularly. As a result, they may not pump blood to the body and lungs as well as they should. This can weaken the heart muscle over time and cause heart failure.  What causes atrial fibrillation?  AFib is more common in older adults. It has many possible causes including:  · Coronary artery disease  · Heart valve disease  · Heart attack  · Heart surgery  · High blood pressure  · Thyroid disease  · Diabetes  · Lung disease  · Sleep apnea  · Heavy alcohol use  In some cases of AFib, doctors do not know the cause.  What are the symptoms of atrial fibrillation?  AFib may or may not cause symptoms. If symptoms do occur, they may include:  · A fast, pounding,  irregular heartbeat  · Shortness of breath  · Tiredness  · Dizziness or fainting  · Chest pain  How is atrial fibrillation treated?  Treatments for AFib can include any of the options below.  · Medicines. You may be prescribed:  ¨ Heart rate medicines to help slow down the heartbeat  ¨ Heart rhythm medicines to help the heart beat more regularly  ¨ Anti-clotting medicines to help reduce the risk for blood clots and stroke  · Electrical cardioversion. Your healthcare provider uses special pads or paddles to send one or more brief electrical shocks to the heart. This can help reset the heartbeat to normal.  · Ablation. Long, thin tubes called catheters are threaded through a blood vessel to the heart. There, the catheters send out hot or cold energy to the areas causing the abnormal signals. This energy destroys the problem tissue or cells. This improves the chances that your heart will stay in normal rhythm without using medicines. If your heart rate and rhythm cant be controlled, you may need ablation and a pacemaker. These will help control the heart rate and regularity of the heartbeat.  · Surgery. During surgery, your healthcare provider may use different methods to create scar tissue in the areas of the heart causing the abnormal signals. The scar tissue disrupts the abnormal signals and may stop AFib from occurring.  What are the complications of atrial fibrillation?  These can include:  · Blood clots  · Stroke  · Heart failure. This problem occurs when the heart muscle weakens so much that it can no longer pump blood well.  When should I call my healthcare provider?  Call your healthcare provider right away if you have any of these:  · Symptoms that dont get better with treatment, or get worse  · New symptoms   Date Last Reviewed: 5/1/2016  © 1719-2453 [employer]. [address] [place] [address], [place] [address]. All rights reserved. This information is not intended as a substitute for professional  medical care. Always follow your healthcare professional's instructions.        Fainting: Vagal Reaction  Fainting (syncope) is a temporary loss of consciousness that is associated with a loss of postural tone. Its also called passing out. It occurs when blood flow to the brain is less than normal. Your healthcare provider believes that your fainting was because of a vagal reaction. This condition is not a sign of serious disease.  A vagal reaction is a response in your body that causes your pulse to slow down or the blood vessels to expand. This causes your blood pressure to fall. And this sends less blood to your brain if you are standing or sitting. That results in dizziness, near-fainting, or fainting. Lying down usually stops the reaction within 60 seconds.  This response can occur during sudden fear, severe pain, emotional stress, overexertion, overheating, hunger, nausea or vomiting, prolonged standing, or standing up after sitting or lying for a long time.  Home care  Follow these guidelines when caring for yourself at home:  · Rest today. Go back to your normal activities as soon as you are feeling back to normal.  · Stay hydrated and avoid skipping meals.  · If you feel lightheaded or dizzy, lie down right away. Or sit with your head lowered between your knees.  Follow-up care  Follow up with your healthcare provider, or as advised.  When to seek medical advice  Call your healthcare provider right away if any of these occur:  · Another fainting spell thats not explained by the common causes listed above  · Pain in your chest, arm, neck, jaw, back, or abdomen  · Shortness of breath  · Severe headache or seizure  · Your heart beats very rapidly, very slowly, or irregularly (palpitations)  Date Last Reviewed: 12/1/2016  © 2809-5751 [employer]. [address] [place] [address], [place] [address]. All rights reserved. This information is not intended as a substitute for professional medical care. Always  follow your healthcare professional's instructions.

## 2017-09-14 NOTE — PROGRESS NOTES
Subjective:    Patient ID:  Danita Hall is a 66 y.o. female who presents for evaluation of Pre Syncope      HPI  I had the pleasure of seeing Ms. Hall today in our electrophysiology clinic in consultation for her episodes of syncope and atrial fibrillation. As you are aware she is a pleasant 66 year-old woman who began having episodes of near syncope 4 years ago. 2 recent episodes brought her to the emergency room. In June of 2016 she stood up from where she was sitting, felt chest pressure then felt nauseous, began dry heaving, dizzy, diaphoretic and then had significant confusion with decreased level of consciousness and then with brief loss of consciousness. EMS was called and she was very altered. Reportedly pulse was in the 30s with systolic BP of 110 when EMS arrived and atropine was given (no strips available) with resolution of bradycardia however she was still altered. In the ER she was noted to be incontinent and lethargic. She gradually returned to baseline but still complained of intermittent nausea/vomiting. Serum potassium was 3.1. CT head was negative. Initially in sinus rhythm however during one episode of retching she developed a paroxysm of atrial fibrillation with intermittent RVR. She had spontaneous conversion to sinus rhythm. She was seen by general cardiology and was started on metoprolol and eliquis. Echocardiogram noted normal LV function. She wore a 30 day event monitor which showed only sinus rhythm with PACs.     She did well until 8/24/2017 when she was found lying on her couch with her eyes open and staring. She then began to feel poorly and have nausea and vomiting. EMS was called and she was very slow to respond however she was able to move herself from the sofa to the stretcher. No mention of unstable vital signs with EMS. In the ER heart rate was 50s-70s. Potassium on evaluation was 2.9. EEG was performed and was negative for seizure activity. Repeat echocardiogram noted  normal LVEF. Troponin was initially elevated at 0.07 and trended down to 0.04 then 0.03. Cardiology saw her and recommended outpatient EP evaluation and an outpatient PET stress test.     Ms. Hall notes multiple similar episodes over the past 8 years but none as severe as these last 2. She felt like they always occurred after eating greasy foods or drinking milk. Because of that she tries to avoid those things. She rarely drinks water and mostly drinks diet sodas. The last episode in August was the first time this happened while lying down. She notes occasional palpitations and heart fluttering.      ECHO 8/2017 CONCLUSIONS     1 - Normal left ventricular systolic function (EF 60-65%).     2 - No wall motion abnormalities.     3 - Indeterminate LV diastolic function.     4 - Normal right ventricular systolic function .     5 - The estimated PA systolic pressure is greater than 19 mmHg.     6 - Trivial mitral regurgitation.     7 - Trivial tricuspid regurgitation.     8 - Trivial pericardial effusion.    I reviewed all electrocardiograms in [facility]. ECGs from 6/26/2017 show atrial fibrillation with IVCD (atypical RBBB and left axis deviation) with ventricular rates of 109-117. An ECGs dated 6/27/2017 and 8/24/2017 show sinus rhythm with IVCD at a rate of 68-77 bpm.     My interpretation of today's in clinic electrocardiogram is sinus rhythm with mild first degree AV block (204ms) and IVCD.     Review of Systems   Constitution: Negative for weakness and malaise/fatigue.   HENT: Negative for congestion.    Eyes: Negative.    Cardiovascular: Positive for irregular heartbeat, leg swelling, near-syncope, palpitations and syncope. Negative for chest pain, dyspnea on exertion, orthopnea and paroxysmal nocturnal dyspnea.   Respiratory: Negative.  Negative for cough and shortness of breath.    Hematologic/Lymphatic: Negative for bleeding problem. Does not bruise/bleed easily.   Skin: Negative.    Musculoskeletal: Positive  for arthritis and joint pain.   Gastrointestinal: Negative for abdominal pain, hematochezia and melena.   Neurological: Positive for light-headedness (per hpi). Negative for focal weakness.   Psychiatric/Behavioral: Negative.         Objective:    Physical Exam   Constitutional: She is oriented to person, place, and time. She appears well-developed and well-nourished. No distress.   HENT:   Head: Normocephalic and atraumatic.   Eyes: Conjunctivae are normal. Right eye exhibits no discharge. Left eye exhibits no discharge.   Neck: Neck supple. No JVD present.   Cardiovascular: Normal rate, regular rhythm and normal heart sounds.  Exam reveals no gallop and no friction rub.    No murmur heard.  Pulmonary/Chest: Effort normal and breath sounds normal. No respiratory distress. She has no wheezes. She has no rales.   Abdominal: Soft. Bowel sounds are normal. She exhibits no distension. There is no tenderness.   Musculoskeletal: She exhibits no edema.   Neurological: She is alert and oriented to person, place, and time.   Skin: Skin is warm and dry. She is not diaphoretic. No erythema.   Psychiatric: She has a normal mood and affect. Her behavior is normal. Thought content normal.   Vitals reviewed.        Assessment:       1. Vasovagal syncope    2. Morbid obesity due to excess calories    3. Paroxysmal atrial fibrillation         Plan:       In summary, Ms Hall is a pleasant 66 year-old woman with obesity and recurrent near syncope and an episode of syncope. 2 episodes prompted ER visits. Both visits she had significant hypokalemia. Both episodes were associated with diaphoresis, warning prodrome of not feeling well, nausea and once had vomiting. One episode also was associated with bradycardia responsive to atropine however this did not improve her symptoms. Her symptoms are most consistent with vasovagal events. However she does have conduction system disease with an IVCD. Atropine should worsen infra-moi  block. Recommend ILR implant for further monitoring for any AV block that may be associated with her episodes (negative 30 day monitor). We discussed increasing water intake, avoiding triggers, and wearing compression stockings. She has enough of a prodrome to pull over and park her car.  Also instructed her when she has a prodrome to lie down, cross her legs and perform counterpressure maneuvers (explained and demonstrated to her).    I had a long discussion with the patient about the pathophysiology and risks of atrial fibrillation and its basic pathophysiology, including its health implications and treatment options with the patient. Specifically, I addressed the need for CVA (stroke) prophylaxis with aspirin versus oral anticoagulation (warfarin vs NOACs, discussed bleeding risks, irreversibility of NOACs vs Vitamin K/plasma reversal of warfarin, and need to come to the ER for any head trauma for CT scanning even if asymptomatic).  I also discussed the goal to reduce symptomatic arrhythmic episodes by pharmacologic and/or procedural methods and utilizing a rhythm versus a rate control strategy. She does not have many symptoms for now. [id number] score is 2 and she is appropriately anticoagulated. She desires to continue Eliquis.    I would also like her to see nephrology to ensure she does not have a RTA as the cause of her recurrent hypokalemia with non-gap acidosis as hypokalemia may be triggering her events.    Lastly she has noted having squeezing chest pain with one of her events and has had mildly elevated troponin. Cardiology recommended outpatient PET stress (obese, cannot run on treadmill due to knee issues) however this has not been ordered. Will order.    Plan  Nephrology consult for recurrent hypokalemia   ILR implant ([employer]), hold eliquis for 3 days prior  PET stress test    Will notify her of stress results    Thank you for allowing me to participate in the care of this patient. Please do not  hesitate to call me with any questions or concerns.    Roly Ramey MD, PhD  Cardiac Electrophysiology

## 2017-09-14 NOTE — TELEPHONE ENCOUNTER
Pt verbalized understanding and had no questions, does have upcoming stress with with cardiology. No questions

## 2017-09-14 NOTE — TELEPHONE ENCOUNTER
----- Message from Lizet Bruce NP sent at 9/14/2017  9:46 AM CDT -----  Labs normal. If cardiology studies normal will refer to endocrinology for blood sugar work up.

## 2017-09-15 DIAGNOSIS — R00.1 BRADYCARDIA: Primary | ICD-10-CM

## 2017-09-22 ENCOUNTER — TELEPHONE (OUTPATIENT)
Dept: ELECTROPHYSIOLOGY | Facility: CLINIC | Age: 67
End: 2017-09-22

## 2017-09-22 NOTE — TELEPHONE ENCOUNTER
IMPLANTABLE LOOP RECORDER EDUCATION CHECKLIST    9/29/2017 @ 6 AM  REPORT TO THE CARDIOLOGY WAITING AREA ON 3RD FLOOR OF THE HOSPITAL (DO NOT REPORT TO CLINIC)  Directions for Reporting to Cardiology Waiting Area in the Hospital  If you park in the Parking Garage:  Take elevators to the 2nd floor  Walk up ramp and turn right by Gold Elevators  Take elevator to the 3rd floor  Upon exiting the elevator, turn away from the clinic areas  Walk long cazares around to front of hospital to area with windows overlooking [facility]  Check in at Reception Desk  OR  If family is dropping you off:  Have them drop you off at the front of the Hospital  (Near the ER, where all the flags are hung).  Take the E elevators to the 3rd floor.  Check in at the Reception Desk in the waiting room.        WASH WITH HIBICLENS ANTIBACTERIAL SOAP (SUCH AS DIAL) ON THE NIGHT BEFORE AND THE MORNING OF YOUR PROCEDURE PRIOR TO ARRIVAL    DO NOT EAT OR DRINK ANYTHING 6 HOURS BEFORE YOUR PROCEDURE    MEDICATIONS:  HOLD ELIQUIS FOR 3 DAYS PRIOR TO PROCEDURE. LAST DOSE WILL BE 9/26/2017.   YOU MAY TAKE OTHER NORMAL MORNING MEDICATIONS WITH A SIP OF WATER    YOU WILL GO HOME AFTER YOUR PROCEDURE    YOUR PAIN WILL ME MONITORED BY THE SEDATION NURSE DURING YOUR PROCEDURE    THE ABOVE INSTRUCTIONS WERE GIVEN TO THE PATIENT VERBALLY AND THEY VERBALIZED UNDERSTANDING. THEY DO NOT REQUIRE ANY SPECIAL NEEDS AND DO NOT HAVE ANY LEARNING BARRIERS.     Any need to reschedule or cancel procedures, or any questions regarding your procedures should be addressed directly with the Arrhythmia Department Nurses at the following phone number: [phone]

## 2017-09-28 ENCOUNTER — TELEPHONE (OUTPATIENT)
Dept: ELECTROPHYSIOLOGY | Facility: CLINIC | Age: 67
End: 2017-09-28

## 2017-09-28 NOTE — TELEPHONE ENCOUNTER
Spoke with patients mother and advised that we have postponed her procedure until the insurance coverage is cleared up. If not approved, the patient would be responsible for $10,000 out of pocket expense. Advised mother that several messages have been left for pt to call the pre auth department.

## 2017-09-28 NOTE — TELEPHONE ENCOUNTER
Received message from pre-service that patient has out of network insurance. Scheduled for loop recorder tomorrow AM. Called pt to review current insurance info and assist her with getting in touch with pre-service/billing to discuss costs for procedure. No answer. Left voicemail.

## 2017-10-04 ENCOUNTER — TELEPHONE (OUTPATIENT)
Dept: ELECTROPHYSIOLOGY | Facility: CLINIC | Age: 67
End: 2017-10-04

## 2017-10-05 NOTE — TELEPHONE ENCOUNTER
Called pt to follow up on ILR procedure which was canceled 9/29 r/t [place] [facility] Explained could refer pt to Dr. Esposito at [place] ([facility] in-network there). Pt states she wanted to think about it and would call back with her decision.